# Patient Record
Sex: FEMALE | Race: BLACK OR AFRICAN AMERICAN | Employment: OTHER | URBAN - METROPOLITAN AREA
[De-identification: names, ages, dates, MRNs, and addresses within clinical notes are randomized per-mention and may not be internally consistent; named-entity substitution may affect disease eponyms.]

---

## 2017-04-17 ENCOUNTER — OFFICE VISIT (OUTPATIENT)
Dept: SURGERY | Age: 67
End: 2017-04-17

## 2017-04-17 VITALS
HEIGHT: 63 IN | HEART RATE: 72 BPM | SYSTOLIC BLOOD PRESSURE: 145 MMHG | RESPIRATION RATE: 18 BRPM | BODY MASS INDEX: 34.05 KG/M2 | WEIGHT: 192.2 LBS | TEMPERATURE: 98.2 F | OXYGEN SATURATION: 98 % | DIASTOLIC BLOOD PRESSURE: 66 MMHG

## 2017-04-17 DIAGNOSIS — L98.9 BACK SKIN LESION: Primary | ICD-10-CM

## 2017-04-17 NOTE — PROGRESS NOTES
Patient is a 78 y. o.female who presents for an evaluation of a skin lesion of the back which she relays as having been present for the past 23 years. She relays the area has grown larger in size over the past year, however, it is not painful nor has the area ever been opened or drained.

## 2017-04-17 NOTE — MR AVS SNAPSHOT
Visit Information Date & Time Provider Department Dept. Phone Encounter #  
 4/17/2017  8:45 AM MD [facility]land [facility] [phone] [id number] Your Appointments 9/13/2017  9:30 AM  
ESTABLISHED PATIENT with QUENTIN [facility] ([address]) Appt Note: Return in about 6 months (around 9/14/2017) for FU with Ava Brooks in 6 mos with prior renal US. [address] [facility], [place] [facility] [address]  
  
   
 [address] [facility], [address] [facility] [address] Upcoming Health Maintenance Date Due Hepatitis C Screening 1950 HEMOGLOBIN A1C Q6M 1950 LIPID PANEL Q1 1950 FOOT EXAM Q1 3/15/1961 MICROALBUMIN Q1 3/15/1961 EYE EXAM RETINAL OR DILATED Q1 3/15/1961 DTaP/Tdap/Td series (1 - Tdap) 3/15/1972 FOBT Q 1 YEAR AGE 50-75 3/15/2001 ZOSTER VACCINE AGE 60> 3/15/2011 GLAUCOMA SCREENING Q2Y 3/15/2016 Pneumococcal 65+ High/Highest Risk (1 of 2 - PCV13) 3/15/2016 MEDICARE YEARLY EXAM 3/15/2016 INFLUENZA AGE 9 TO ADULT 8/1/2016 BREAST CANCER SCRN MAMMOGRAM 4/25/2018 Allergies as of 4/17/2017  Review Complete On: 3/14/2017 By: Lydia Dudley MD  
  
 Severity Noted Reaction Type Reactions Latex, Natural Rubber    Unknown (comments) Anastrozole  10/19/2015    Other (comments) CAUSED JOINT PAIN Lipitor [Atorvastatin]  09/27/2013    Other (comments) Muscle weakness Current Immunizations  Never Reviewed No immunizations on file. Not reviewed this visit Vitals BP Pulse Temp Resp Height(growth percentile) Weight(growth percentile) 145/66 (BP 1 Location: Right arm, BP Patient Position: Sitting) 72 98.2 °F (36.8 °C) (Oral) 18 5' 3\" (1.6 m) 192 lb 3.2 oz (87.2 kg) SpO2 BMI OB Status Smoking Status 98% 34.05 kg/m2 Postmenopausal Never Smoker BMI and BSA Data Body Mass Index Body Surface Area 34.05 kg/m 2 1.97 m 2 [facility] [place], [address] [phone] Your Updated Medication List  
  
   
This list is accurate as of: 4/17/17  9:17 AM.  Always use your most recent med list.  
  
  
  
  
 citalopram 20 mg tablet Commonly known as:  Dorthea Turkmen FLEXERIL PO Take  by mouth as needed. glucose blood VI test strips strip Commonly known as:  ASCENSIA AUTODISC VI, ONE TOUCH ULTRA TEST VI  
by Does Not Apply route See Admin Instructions. HumaLOG Mix 75-25 100 unit/mL (75-25) injection Generic drug:  insulin lispro protamine/insulin lispro  
110 Units by SubCUTAneous route once. Lancets Misc  
by Does Not Apply route. losartan-hydroCHLOROthiazide 100-12.5 mg per tablet Commonly known as:  HYZAAR Take 1 Tab by mouth daily. multivitamin tablet Commonly known as:  ONE A DAY Take 1 Tab by mouth daily. KISHAN D ARCO BARK PO Take  by mouth. PROBIOTIC 4X 10-15 mg Tbec Generic drug:  B.infantis-B.ani-B.long-B.bifi Take  by mouth. VITAMIN D3 1,000 unit Cap Generic drug:  cholecalciferol Take  by mouth. Patient Instructions If you have any questions or concerns about today's appointment, the verbal and/or written instructions you were given for follow up care, please call our office at [phone]. [facility] - [address], [place] [phone] office [phone] Introducing [facility] & HEALTH SERVICES! [facility] introduces [employer] patient portal. Now you can access parts of your medical record, email your doctor's office, and request medication refills online. 1. In your internet browser, go to [email or web address] 2. Click on the First Time User? Click Here link in the Sign In box. You will see the New Member Sign Up page. 3. Enter your [employer] Access Code exactly as it appears below. You will not need to use this code after youve completed the sign-up process. If you do not sign up before the expiration date, you must request a new code. · [employer] Access Code: [email or web address] Expires: 4/19/2017 10:24 AM 
 
4. Enter the last four digits of your Social Security Number (xxxx) and Date of Birth (mm/dd/yyyy) as indicated and click Submit. You will be taken to the next sign-up page. 5. Create a [employer] ID. This will be your [employer] login ID and cannot be changed, so think of one that is secure and easy to remember. 6. Create a [employer] password. You can change your password at any time. 7. Enter your Password Reset Question and Answer. This can be used at a later time if you forget your password. 8. Enter your e-mail address. You will receive e-mail notification when new information is available in [address] 9. Click Sign Up. You can now view and download portions of your medical record. 10. Click the Download Summary menu link to download a portable copy of your medical information. If you have questions, please visit the Frequently Asked Questions section of the [employer] website. Remember, [employer] is NOT to be used for urgent needs. For medical emergencies, dial 911. Now available from your iPhone and Android! Please provide this summary of care documentation to your next provider. Your primary care clinician is listed as Phoenix Montes. If you have any questions after today's visit, please call [phone].

## 2017-04-17 NOTE — PROGRESS NOTES
General Surgery Consult    Criss Lai  Admit date: (Not on file)    MRN: [id number]     : 1950     Age: 78 y.o. Attending Physician: Ciera Yee MD [facility]      History of Present Illness:      Criss Lai is a 78 y.o. female who presented with a back skin lesion. She had this lesion for more than 25 years. No pain. Possibly increasing in size. No fever. No history of skin cancer.  Had breast cancer in the past.    Patient Active Problem List    Diagnosis Date Noted    Cancer of left breast ([address] 2013    DM (diabetes mellitus) ([address] 2013    HTN (hypertension) 2013    Carpal tunnel syndrome 2012    Contusion of wrist 2012    Arthritis of knee 2012    Renal mass 2011    Follow-up examination following surgery     Anxiety state 10/16/2007    Benign essential HTN 10/16/2007    Generalized anxiety disorder 10/16/2007    Headache 10/16/2007    Major depressive disorder, single episode, moderate ([address] 10/16/2007    Breast cancer, female ([address] 10/16/2007    Adiposity 10/16/2007    Multiple-type hyperlipidemia 10/16/2007    Atrophic vaginitis 10/16/2007    Type 2 diabetes mellitus ([address] 10/16/2007    Sleep apnea 10/16/2007     Past Medical History:   Diagnosis Date    Ankle swelling     Arthritis     Breast cancer ([address] 2003    Lt breast    Cancer [facility])     Left Breast    Chemotherapy     6 weeks for lt breast    Colon polyps     Diabetes ([address]     Follow-up examination following surgery     Heart murmur     Hematuria     Hypertension     Joint pain     Renal cyst     Seizures ([address]     Weight gain       Past Surgical History:   Procedure Laterality Date    DELIVERY       x 3    HX GYN      ovary removed    HX MODIFIED RADICAL MASTECTOMY      Left Breast    HX NEPHRECTOMY  11    left partial nephrectomy - Dr. Brandt Martinez      Social History   Substance Use Topics    Smoking status: Never Smoker    Smokeless tobacco: Never Used    Alcohol use No      History   Smoking Status    Never Smoker   Smokeless Tobacco    Never Used     Family History   Problem Relation Age of Onset    Heart Disease Mother     Asthma Mother     Hypertension Father     Colon Cancer Father     Diabetes Maternal Grandmother     Breast Cancer Paternal Grandmother     Breast Cancer Other       Current Outpatient Prescriptions   Medication Sig    B.infantis-B.ani-B.long-B.bifi (PROBIOTIC 4X) 10-15 mg TbEC Take  by mouth.  KISHAN D ARCO BARK PO Take  by mouth.  citalopram (CELEXA) 20 mg tablet     Cholecalciferol, Vitamin D3, (VITAMIN D3) 1,000 unit cap Take  by mouth.  insulin mixture 75-25 (HUMALOG MIX 75-25) 100 unit/mL (75-25) Susp 110 Units by SubCUTAneous route once.  CYCLOBENZAPRINE HCL (FLEXERIL PO) Take  by mouth as needed.  losartan-hydrochlorothiazide (HYZAAR) 100-12.5 mg per tablet Take 1 Tab by mouth daily.  glucose blood VI test strips (ASCENSIA AUTODISC VI, ONE TOUCH ULTRA TEST VI) strip by Does Not Apply route See Admin Instructions.  Lancets Misc by Does Not Apply route.  multivitamin (ONE A DAY) tablet Take 1 Tab by mouth daily. No current facility-administered medications for this visit. Allergies   Allergen Reactions    Latex, Natural Rubber Unknown (comments)    Anastrozole Other (comments)     CAUSED JOINT PAIN    Lipitor [Atorvastatin] Other (comments)     Muscle weakness          Review of Systems:  Pertinent items are noted in the History of Present Illness.     Objective:     Visit Vitals    /66 (BP 1 Location: Right arm, BP Patient Position: Sitting)    Pulse 72    Temp 98.2 °F (36.8 °C) (Oral)    Resp 18    Ht 5' 3\" (1.6 m)    Wt 87.2 kg (192 lb 3.2 oz)    SpO2 98%    BMI 34.05 kg/m2       Physical Exam:      General:  in no apparent distress and well developed and well nourished   Eyes:  conjunctivae and sclerae normal, pupils equal, round, reactive to light   Throat & Neck: Normal                   Skin: There is a 2 mm small sebaceous cyst in the back just left to the midline. No overlying skin changes. Imaging and Lab Review:     CBC: No results found for: WBC, RBC, HGB, HCT, PLT, HGBEXT, HCTEXT, PLTEXT  BMP: No results found for: GLU, NA, K, CL, CO2, BUN, CREA, CA  CMP:No results found for: GLU, NA, K, CL, CO2, BUN, CREA, CA, AGAP, BUCR, TBIL, GPT, AP, TP, ALB, GLOB, AGRAT    No results found for this or any previous visit (from the past 24 hour(s)). images and reports reviewed    Assessment:   Mal Rawls is a 78 y.o. female is presenting with a very small (2 to 3 mm) sebaceous cyst.    Plan:     No need for any surgical intervention.      Please call me if you have any questions (cell phone: [phone])     Signed By: Mahogany Ly MD     April 17, 2017

## 2017-04-17 NOTE — PATIENT INSTRUCTIONS
If you have any questions or concerns about today's appointment, the verbal and/or written instructions you were given for follow up care, please call our office at [phone].     [facility] - [address]    [phone] office  [phone]

## 2017-04-18 ENCOUNTER — TELEPHONE (OUTPATIENT)
Dept: SURGERY | Age: 67
End: 2017-04-18

## 2017-04-18 NOTE — TELEPHONE ENCOUNTER
TOD on cell VM with reference to [facility] email \"we are not contracted (non-par) with [employer] for their Medicare products\". Notified patient we don't accept this insurance and she may be lialbe for payment not discounted by her plan. Home phone had no vociemail. ...

## 2017-06-12 ENCOUNTER — HOSPITAL ENCOUNTER (OUTPATIENT)
Dept: MAMMOGRAPHY | Age: 67
Discharge: HOME | End: 2017-06-12
Payer: MEDICARE

## 2017-06-12 DIAGNOSIS — Z12.31 VISIT FOR SCREENING MAMMOGRAM: ICD-10-CM

## 2017-06-12 PROCEDURE — 77067 SCR MAMMO BI INCL CAD: CPT

## 2017-09-25 ENCOUNTER — HOSPITAL ENCOUNTER (OUTPATIENT)
Dept: GENERAL RADIOLOGY | Age: 67
Discharge: HOME | End: 2017-09-25
Payer: COMMERCIAL

## 2017-09-25 DIAGNOSIS — N95.1 MENOPAUSAL SYMPTOMS: ICD-10-CM

## 2017-09-25 PROCEDURE — 77080 DXA BONE DENSITY AXIAL: CPT

## 2018-09-06 ENCOUNTER — HOSPITAL ENCOUNTER (OUTPATIENT)
Dept: MAMMOGRAPHY | Age: 68
Discharge: HOME | End: 2018-09-06
Attending: OBSTETRICS & GYNECOLOGY
Payer: MEDICARE

## 2018-09-06 DIAGNOSIS — Z12.31 VISIT FOR SCREENING MAMMOGRAM: ICD-10-CM

## 2018-09-06 PROCEDURE — 77067 SCR MAMMO BI INCL CAD: CPT

## 2019-11-07 ENCOUNTER — HOSPITAL ENCOUNTER (OUTPATIENT)
Dept: LAB | Age: 69
Discharge: HOME | End: 2019-11-07

## 2019-11-07 ENCOUNTER — HOSPITAL ENCOUNTER (OUTPATIENT)
Dept: GENERAL RADIOLOGY | Age: 69
Discharge: HOME | End: 2019-11-07
Payer: MEDICARE

## 2019-11-07 ENCOUNTER — HOSPITAL ENCOUNTER (OUTPATIENT)
Dept: LAB | Age: 69
Discharge: HOME | End: 2019-11-07
Payer: MEDICARE

## 2019-11-07 DIAGNOSIS — M51.04 HNP (HERNIATED NUCLEUS PULPOSUS WITH MYELOPATHY), THORACIC: ICD-10-CM

## 2019-11-07 DIAGNOSIS — Z01.818 PRE-OP TESTING: ICD-10-CM

## 2019-11-07 LAB
ATRIAL RATE: 68 BPM
CALCULATED P AXIS, ECG09: 66 DEGREES
CALCULATED R AXIS, ECG10: 23 DEGREES
CALCULATED T AXIS, ECG11: 60 DEGREES
DIAGNOSIS, 93000: NORMAL
P-R INTERVAL, ECG05: 176 MS
Q-T INTERVAL, ECG07: 396 MS
QRS DURATION, ECG06: 96 MS
QTC CALCULATION (BEZET), ECG08: 421 MS
SENTARA SPECIMEN COL,SENBCF: NORMAL
VENTRICULAR RATE, ECG03: 68 BPM

## 2019-11-07 PROCEDURE — 71046 X-RAY EXAM CHEST 2 VIEWS: CPT

## 2019-11-07 PROCEDURE — 93005 ELECTROCARDIOGRAM TRACING: CPT

## 2019-11-07 PROCEDURE — 99001 SPECIMEN HANDLING PT-LAB: CPT

## 2019-12-31 RX ORDER — TELMISARTAN AND HYDROCHLORTHIAZIDE 80; 12.5 MG/1; MG/1
1 TABLET ORAL
COMMUNITY
Start: 2019-11-13

## 2019-12-31 NOTE — PERIOP NOTES
PAT - SURGICAL PRE-ADMISSION INSTRUCTIONS    NAME:  Dennis Soto                                                          TODAY'S DATE:  12/31/2019    SURGERY DATE:  1/7/2020                                  SURGERY ARRIVAL TIME:   0530    1. Do NOT eat or drink anything, including candy or gum, after MIDNIGHT on 01/06 , unless you have specific instructions from your Surgeon or Anesthesia Provider to do so. 2. No smoking on the day of surgery. 3. No alcohol 24 hours prior to the day of surgery. 4. No recreational drugs for one week prior to the day of surgery. 5. Leave all valuables, including money/purse, at home. 6. Remove all jewelry, nail polish, makeup (including mascara); no lotions, powders, deodorant, or perfume/cologne/after shave. 7. Glasses/Contact lenses and Dentures may be worn to the hospital.  They will be removed prior to surgery. 8. Call your doctor if symptoms of a cold or illness develop within 24 ours prior to surgery. 9. AN ADULT MUST DRIVE YOU HOME AFTER OUTPATIENT SURGERY. 10. If you are having an OUTPATIENT procedure, please make arrangements for a responsible adult to be with you for 24 hours after your surgery. 11. If you are admitted to the hospital, you will be assigned to a bed after surgery is complete. Normally a family member will not be able to see you until you are in your assigned bed. 15. Family is encouraged to accompany you to the hospital.  We ask visitors in the treatment area to be limited to ONE person at a time to ensure patient privacy. EXCEPTIONS WILL BE MADE AS NEEDED. 15. Children under 12 are discouraged from entering the treatment area and need to be supervised by an adult when in the waiting room. Special Instructions: Take these medications the morning of surgery with a sip of water:  NONE    Patient Prep:    use CHG solution    These surgical instructions were reviewed with Bassam Palafox during the PAT phone call. Directions:   On the morning of surgery, please go to the [facility]. Enter the building from the [facility] entrance, 1st floor (next to the Emergency Room entrance). Take the elevator to the 2nd floor. Sign in at the Registration Desk.     If you have any questions and/or concerns, please do not hesitate to call:  (Prior to the day of surgery)  [facility] unit:  [phone]  (Day of surgery)  [facility] unit:  [phone]

## 2020-01-02 ENCOUNTER — HOSPITAL ENCOUNTER (OUTPATIENT)
Dept: LAB | Age: 70
Discharge: HOME | End: 2020-01-02

## 2020-01-02 ENCOUNTER — HOSPITAL ENCOUNTER (OUTPATIENT)
Dept: PREADMISSION TESTING | Age: 70
Discharge: HOME | End: 2020-01-02

## 2020-01-02 LAB — SENTARA SPECIMEN COL,SENBCF: NORMAL

## 2020-01-02 PROCEDURE — 99001 SPECIMEN HANDLING PT-LAB: CPT

## 2020-01-06 ENCOUNTER — ANESTHESIA EVENT (OUTPATIENT)
Dept: SURGERY | Age: 70
End: 2020-01-06
Payer: MEDICARE

## 2020-01-07 ENCOUNTER — APPOINTMENT (OUTPATIENT)
Dept: GENERAL RADIOLOGY | Age: 70
End: 2020-01-07
Attending: ORTHOPAEDIC SURGERY
Payer: MEDICARE

## 2020-01-07 ENCOUNTER — ANESTHESIA (OUTPATIENT)
Dept: SURGERY | Age: 70
End: 2020-01-07
Payer: MEDICARE

## 2020-01-07 ENCOUNTER — HOSPITAL ENCOUNTER (OUTPATIENT)
Age: 70
Setting detail: OUTPATIENT SURGERY
Discharge: HOME | End: 2020-01-07
Attending: ORTHOPAEDIC SURGERY | Admitting: ORTHOPAEDIC SURGERY
Payer: MEDICARE

## 2020-01-07 VITALS
HEIGHT: 64 IN | OXYGEN SATURATION: 94 % | BODY MASS INDEX: 35.17 KG/M2 | RESPIRATION RATE: 16 BRPM | WEIGHT: 206 LBS | HEART RATE: 60 BPM | SYSTOLIC BLOOD PRESSURE: 154 MMHG | DIASTOLIC BLOOD PRESSURE: 77 MMHG | TEMPERATURE: 98.4 F

## 2020-01-07 LAB — GLUCOSE BLD STRIP.AUTO-MCNC: 100 MG/DL (ref 70–110)

## 2020-01-07 PROCEDURE — 82962 GLUCOSE BLOOD TEST: CPT

## 2020-01-07 PROCEDURE — 74011250637 HC RX REV CODE- 250/637: Performed by: NURSE ANESTHETIST, CERTIFIED REGISTERED

## 2020-01-07 PROCEDURE — 74011250636 HC RX REV CODE- 250/636: Performed by: NURSE ANESTHETIST, CERTIFIED REGISTERED

## 2020-01-07 RX ORDER — LIDOCAINE HYDROCHLORIDE 10 MG/ML
0.1 INJECTION, SOLUTION EPIDURAL; INFILTRATION; INTRACAUDAL; PERINEURAL AS NEEDED
Status: DISCONTINUED | OUTPATIENT
Start: 2020-01-07 | End: 2020-01-07 | Stop reason: HOSPADM

## 2020-01-07 RX ORDER — FAMOTIDINE 20 MG/1
20 TABLET, FILM COATED ORAL ONCE
Status: COMPLETED | OUTPATIENT
Start: 2020-01-07 | End: 2020-01-07

## 2020-01-07 RX ORDER — SODIUM CHLORIDE, SODIUM LACTATE, POTASSIUM CHLORIDE, CALCIUM CHLORIDE 600; 310; 30; 20 MG/100ML; MG/100ML; MG/100ML; MG/100ML
50 INJECTION, SOLUTION INTRAVENOUS CONTINUOUS
Status: DISCONTINUED | OUTPATIENT
Start: 2020-01-07 | End: 2020-01-07 | Stop reason: HOSPADM

## 2020-01-07 RX ORDER — MIDAZOLAM HYDROCHLORIDE 1 MG/ML
INJECTION, SOLUTION INTRAMUSCULAR; INTRAVENOUS AS NEEDED
Status: SHIPPED | OUTPATIENT
Start: 2020-01-07

## 2020-01-07 RX ORDER — INSULIN LISPRO 100 [IU]/ML
INJECTION, SOLUTION INTRAVENOUS; SUBCUTANEOUS ONCE
Status: DISCONTINUED | OUTPATIENT
Start: 2020-01-07 | End: 2020-01-07 | Stop reason: HOSPADM

## 2020-01-07 RX ORDER — CEFAZOLIN SODIUM 2 G/50ML
2 SOLUTION INTRAVENOUS
Status: DISCONTINUED | OUTPATIENT
Start: 2020-01-07 | End: 2020-01-07 | Stop reason: HOSPADM

## 2020-01-07 RX ORDER — INSULIN ASPART 100 [IU]/ML
30 INJECTION, SUSPENSION SUBCUTANEOUS
COMMUNITY

## 2020-01-07 RX ADMIN — MIDAZOLAM HYDROCHLORIDE 2 MG: 1 INJECTION, SOLUTION INTRAMUSCULAR; INTRAVENOUS at 07:33

## 2020-01-07 RX ADMIN — SODIUM CHLORIDE, SODIUM LACTATE, POTASSIUM CHLORIDE, AND CALCIUM CHLORIDE 50 ML/HR: 600; 310; 30; 20 INJECTION, SOLUTION INTRAVENOUS at 06:51

## 2020-01-07 RX ADMIN — FAMOTIDINE 20 MG: 20 TABLET ORAL at 06:35

## 2020-01-07 NOTE — DISCHARGE INSTRUCTIONS
DISCHARGE SUMMARY from Nurse    PATIENT INSTRUCTIONS:    After general anesthesia or intravenous sedation, for 24 hours or while taking prescription Narcotics:  · Limit your activities  · Do not drive and operate hazardous machinery  · Do not make important personal or business decisions  · Do  not drink alcoholic beverages  · If you have not urinated within 8 hours after discharge, please contact your surgeon on call. Report the following to your surgeon:  · Excessive pain, swelling, redness or odor of or around the surgical area  · Temperature over 100.5  · Nausea and vomiting lasting longer than 4 hours or if unable to take medications  · Any signs of decreased circulation or nerve impairment to extremity: change in color, persistent  numbness, tingling, coldness or increase pain  · Any questions    What to do at Home:    These are general instructions for a healthy lifestyle:    No smoking/ No tobacco products/ Avoid exposure to second hand smoke  Surgeon General's Warning:  Quitting smoking now greatly reduces serious risk to your health. Obesity, smoking, and sedentary lifestyle greatly increases your risk for illness    A healthy diet, regular physical exercise & weight monitoring are important for maintaining a healthy lifestyle    You may be retaining fluid if you have a history of heart failure or if you experience any of the following symptoms:  Weight gain of 3 pounds or more overnight or 5 pounds in a week, increased swelling in our hands or feet or shortness of breath while lying flat in bed. Please call your doctor as soon as you notice any of these symptoms; do not wait until your next office visit. The discharge information has been reviewed with the patient. The patient verbalized understanding.   Discharge medications reviewed with the patient and appropriate educational materials and side effects teaching were provided. ___________________________________________________________________________________________________________________________________  Patient armband removed and given to patient to take home.   Patient was informed of the privacy risks if armband lost or stolen

## 2020-01-07 NOTE — PROGRESS NOTES
Ortho    Case cancelled as one tray was contaminated, there is no back-up tray and the sterizizer is down.     Will disch and plan to do as second case on Friday.    cadence

## 2020-01-07 NOTE — PERIOP NOTES
Pre-Op Summary    Pt arrived via car with family/friend and is oriented to time, place, person and situation. Patient with steady gait with none assistive devices. Visit Vitals  /75 (BP 1 Location: Right arm, BP Patient Position: At rest)   Pulse 66   Temp 98.4 °F (36.9 °C)   Resp 16   Ht 5' 4\" (1.626 m)   Wt 93.4 kg (206 lb)   SpO2 100%   BMI 35.36 kg/m²       Peripheral IV located on Right hand . Patients belongings are located with . Patient's point of contact is Erikc Lynch, дмитрий and their contact number is: [phone]. They will be in the waiting room. They are able to receive medication information. They will be admitted.

## 2020-01-07 NOTE — PROGRESS NOTES
conducted a pre-surgery visit with Joselyn Pressley, who is a 77 y.o.,female. The  provided the following Interventions:  Initiated a relationship of care and support. Offered prayer and assurance of continued prayers on patient's behalf. Plan:  Chaplains will continue to follow and will provide pastoral care on an as needed/requested basis.  recommends bedside caregivers page  on duty if patient shows signs of acute spiritual or emotional distress.     Bahman Lawson   Spiritual Care   [phone]

## 2020-01-08 NOTE — PERIOP NOTES
Isabel noted to filter in [place] tray in the form of a \"cut\". SPD made aware and stated a 45min turn over to process. Dr. Baca Matters made aware. Case cancelled and rescheduled by surgeon. Pt brought [facility] to [facility].  Hand off given to Stacey Craven, RN

## 2020-01-09 ENCOUNTER — ANESTHESIA EVENT (OUTPATIENT)
Dept: SURGERY | Age: 70
End: 2020-01-09
Payer: MEDICARE

## 2020-01-10 ENCOUNTER — APPOINTMENT (OUTPATIENT)
Dept: GENERAL RADIOLOGY | Age: 70
End: 2020-01-10
Attending: ORTHOPAEDIC SURGERY
Payer: MEDICARE

## 2020-01-10 ENCOUNTER — HOSPITAL ENCOUNTER (OUTPATIENT)
Age: 70
Discharge: HOME | End: 2020-01-11
Attending: ORTHOPAEDIC SURGERY | Admitting: ORTHOPAEDIC SURGERY
Payer: MEDICARE

## 2020-01-10 ENCOUNTER — ANESTHESIA (OUTPATIENT)
Dept: SURGERY | Age: 70
End: 2020-01-10
Payer: MEDICARE

## 2020-01-10 DIAGNOSIS — M48.02 CERVICAL STENOSIS OF SPINAL CANAL: Primary | ICD-10-CM

## 2020-01-10 DIAGNOSIS — Z09 FOLLOW-UP EXAMINATION FOLLOWING SURGERY: ICD-10-CM

## 2020-01-10 LAB
GLUCOSE BLD STRIP.AUTO-MCNC: 129 MG/DL (ref 70–110)
GLUCOSE BLD STRIP.AUTO-MCNC: 171 MG/DL (ref 70–110)
GLUCOSE BLD STRIP.AUTO-MCNC: 280 MG/DL (ref 70–110)

## 2020-01-10 PROCEDURE — C1713 ANCHOR/SCREW BN/BN,TIS/BN: HCPCS | Performed by: ORTHOPAEDIC SURGERY

## 2020-01-10 PROCEDURE — 74011250636 HC RX REV CODE- 250/636

## 2020-01-10 PROCEDURE — 74011250636 HC RX REV CODE- 250/636: Performed by: ANESTHESIOLOGY

## 2020-01-10 PROCEDURE — 77030009868 HC PIN DISTR CASPR AESC -B: Performed by: ORTHOPAEDIC SURGERY

## 2020-01-10 PROCEDURE — 77030014647 HC SEAL FBRN TISSL BAXT -D: Performed by: ORTHOPAEDIC SURGERY

## 2020-01-10 PROCEDURE — 77030013079 HC BLNKT BAIR HGGR 3M -A: Performed by: ANESTHESIOLOGY

## 2020-01-10 PROCEDURE — 74011250636 HC RX REV CODE- 250/636: Performed by: INTERNAL MEDICINE

## 2020-01-10 PROCEDURE — 72040 X-RAY EXAM NECK SPINE 2-3 VW: CPT

## 2020-01-10 PROCEDURE — 74011250636 HC RX REV CODE- 250/636: Performed by: ORTHOPAEDIC SURGERY

## 2020-01-10 PROCEDURE — 77030021678 HC GLIDESCP STAT DISP VERT -B: Performed by: ANESTHESIOLOGY

## 2020-01-10 PROCEDURE — 76210000001 HC OR PH I REC 2.5 TO 3 HR: Performed by: ORTHOPAEDIC SURGERY

## 2020-01-10 PROCEDURE — 74011250637 HC RX REV CODE- 250/637: Performed by: ANESTHESIOLOGY

## 2020-01-10 PROCEDURE — 77030019908 HC STETH ESOPH SIMS -A: Performed by: ANESTHESIOLOGY

## 2020-01-10 PROCEDURE — 77030037302 HC SPCR CERV LORDTC INLC -G: Performed by: ORTHOPAEDIC SURGERY

## 2020-01-10 PROCEDURE — 74011250636 HC RX REV CODE- 250/636: Performed by: NURSE ANESTHETIST, CERTIFIED REGISTERED

## 2020-01-10 PROCEDURE — 74011000250 HC RX REV CODE- 250: Performed by: ORTHOPAEDIC SURGERY

## 2020-01-10 PROCEDURE — 74011000272 HC RX REV CODE- 272: Performed by: ORTHOPAEDIC SURGERY

## 2020-01-10 PROCEDURE — 77010033678 HC OXYGEN DAILY

## 2020-01-10 PROCEDURE — 74011000250 HC RX REV CODE- 250: Performed by: NURSE ANESTHETIST, CERTIFIED REGISTERED

## 2020-01-10 PROCEDURE — 77030031139 HC SUT VCRL2 J&J -A: Performed by: ORTHOPAEDIC SURGERY

## 2020-01-10 PROCEDURE — 77030003666 HC NDL SPINAL BD -A: Performed by: ORTHOPAEDIC SURGERY

## 2020-01-10 PROCEDURE — 76010000174 HC OR TIME 3.5 TO 4 HR INTENSV-TIER 1: Performed by: ORTHOPAEDIC SURGERY

## 2020-01-10 PROCEDURE — 77030006773 HC BLD SAW OSC BRSM -A: Performed by: ORTHOPAEDIC SURGERY

## 2020-01-10 PROCEDURE — 77030004391 HC BUR FLUT MEDT -C: Performed by: ORTHOPAEDIC SURGERY

## 2020-01-10 PROCEDURE — 77030014007 HC SPNG HEMSTAT J&J -B: Performed by: ORTHOPAEDIC SURGERY

## 2020-01-10 PROCEDURE — 77030018673: Performed by: ORTHOPAEDIC SURGERY

## 2020-01-10 PROCEDURE — 77030003028 HC SUT VCRL J&J -A: Performed by: ORTHOPAEDIC SURGERY

## 2020-01-10 PROCEDURE — 77030030102 HC BIT DRL PYRNES K2M -B: Performed by: ORTHOPAEDIC SURGERY

## 2020-01-10 PROCEDURE — 65270000029 HC RM PRIVATE

## 2020-01-10 PROCEDURE — 74011000258 HC RX REV CODE- 258: Performed by: NURSE ANESTHETIST, CERTIFIED REGISTERED

## 2020-01-10 PROCEDURE — 82962 GLUCOSE BLOOD TEST: CPT

## 2020-01-10 PROCEDURE — 74011250637 HC RX REV CODE- 250/637: Performed by: INTERNAL MEDICINE

## 2020-01-10 PROCEDURE — 74011636637 HC RX REV CODE- 636/637

## 2020-01-10 PROCEDURE — 77030010516 HC APPL HEMA CLP TELE -B: Performed by: ORTHOPAEDIC SURGERY

## 2020-01-10 PROCEDURE — 77030008683 HC TU ET CUF COVD -A: Performed by: ANESTHESIOLOGY

## 2020-01-10 PROCEDURE — 76060000038 HC ANESTHESIA 3.5 TO 4 HR: Performed by: ORTHOPAEDIC SURGERY

## 2020-01-10 PROCEDURE — 77030030163 HC BN WAX J&J -A: Performed by: ORTHOPAEDIC SURGERY

## 2020-01-10 PROCEDURE — 77030010938 HC CLP LIG TELE -A: Performed by: ORTHOPAEDIC SURGERY

## 2020-01-10 PROCEDURE — 77030018836 HC SOL IRR NACL ICUM -A: Performed by: ORTHOPAEDIC SURGERY

## 2020-01-10 PROCEDURE — 74011636637 HC RX REV CODE- 636/637: Performed by: INTERNAL MEDICINE

## 2020-01-10 RX ORDER — SODIUM CHLORIDE AND POTASSIUM CHLORIDE .9; .15 G/100ML; G/100ML
SOLUTION INTRAVENOUS
Status: DISPENSED
Start: 2020-01-10 | End: 2020-01-11

## 2020-01-10 RX ORDER — SODIUM CHLORIDE 0.9 % (FLUSH) 0.9 %
5-40 SYRINGE (ML) INJECTION AS NEEDED
Status: DISCONTINUED | OUTPATIENT
Start: 2020-01-10 | End: 2020-01-10 | Stop reason: HOSPADM

## 2020-01-10 RX ORDER — SUCCINYLCHOLINE CHLORIDE 20 MG/ML
INJECTION INTRAMUSCULAR; INTRAVENOUS AS NEEDED
Status: DISCONTINUED | OUTPATIENT
Start: 2020-01-10 | End: 2020-01-10 | Stop reason: HOSPADM

## 2020-01-10 RX ORDER — CLINDAMYCIN PHOSPHATE 900 MG/50ML
INJECTION INTRAVENOUS AS NEEDED
Status: DISCONTINUED | OUTPATIENT
Start: 2020-01-10 | End: 2020-01-10 | Stop reason: HOSPADM

## 2020-01-10 RX ORDER — HYDROCODONE BITARTRATE AND ACETAMINOPHEN 10; 325 MG/1; MG/1
1 TABLET ORAL
Status: DISCONTINUED | OUTPATIENT
Start: 2020-01-10 | End: 2020-01-11 | Stop reason: HOSPADM

## 2020-01-10 RX ORDER — INSULIN GLARGINE 100 [IU]/ML
15 INJECTION, SOLUTION SUBCUTANEOUS
Status: DISCONTINUED | OUTPATIENT
Start: 2020-01-10 | End: 2020-01-11 | Stop reason: HOSPADM

## 2020-01-10 RX ORDER — SODIUM CHLORIDE, SODIUM LACTATE, POTASSIUM CHLORIDE, CALCIUM CHLORIDE 600; 310; 30; 20 MG/100ML; MG/100ML; MG/100ML; MG/100ML
INJECTION, SOLUTION INTRAVENOUS
Status: DISCONTINUED | OUTPATIENT
Start: 2020-01-10 | End: 2020-01-10 | Stop reason: HOSPADM

## 2020-01-10 RX ORDER — PROPOFOL 10 MG/ML
VIAL (ML) INTRAVENOUS
Status: DISCONTINUED | OUTPATIENT
Start: 2020-01-10 | End: 2020-01-10 | Stop reason: HOSPADM

## 2020-01-10 RX ORDER — GLYCOPYRROLATE 0.2 MG/ML
INJECTION INTRAMUSCULAR; INTRAVENOUS AS NEEDED
Status: DISCONTINUED | OUTPATIENT
Start: 2020-01-10 | End: 2020-01-10 | Stop reason: HOSPADM

## 2020-01-10 RX ORDER — PROPOFOL 10 MG/ML
INJECTION, EMULSION INTRAVENOUS AS NEEDED
Status: DISCONTINUED | OUTPATIENT
Start: 2020-01-10 | End: 2020-01-10 | Stop reason: HOSPADM

## 2020-01-10 RX ORDER — OXYCODONE AND ACETAMINOPHEN 10; 325 MG/1; MG/1
2 TABLET ORAL
Status: DISCONTINUED | OUTPATIENT
Start: 2020-01-10 | End: 2020-01-10

## 2020-01-10 RX ORDER — GLYCOPYRROLATE 0.2 MG/ML
INJECTION INTRAMUSCULAR; INTRAVENOUS AS NEEDED
Status: DISCONTINUED | OUTPATIENT
Start: 2020-01-10 | End: 2020-01-10

## 2020-01-10 RX ORDER — ONDANSETRON 2 MG/ML
INJECTION INTRAMUSCULAR; INTRAVENOUS AS NEEDED
Status: DISCONTINUED | OUTPATIENT
Start: 2020-01-10 | End: 2020-01-10 | Stop reason: HOSPADM

## 2020-01-10 RX ORDER — FENTANYL CITRATE 50 UG/ML
INJECTION, SOLUTION INTRAMUSCULAR; INTRAVENOUS AS NEEDED
Status: DISCONTINUED | OUTPATIENT
Start: 2020-01-10 | End: 2020-01-10 | Stop reason: HOSPADM

## 2020-01-10 RX ORDER — SODIUM CHLORIDE 9 MG/ML
125 INJECTION, SOLUTION INTRAVENOUS CONTINUOUS
Status: DISCONTINUED | OUTPATIENT
Start: 2020-01-10 | End: 2020-01-11 | Stop reason: HOSPADM

## 2020-01-10 RX ORDER — SODIUM CHLORIDE, SODIUM LACTATE, POTASSIUM CHLORIDE, CALCIUM CHLORIDE 600; 310; 30; 20 MG/100ML; MG/100ML; MG/100ML; MG/100ML
125 INJECTION, SOLUTION INTRAVENOUS CONTINUOUS
Status: DISCONTINUED | OUTPATIENT
Start: 2020-01-10 | End: 2020-01-10 | Stop reason: HOSPADM

## 2020-01-10 RX ORDER — DEXAMETHASONE SODIUM PHOSPHATE 4 MG/ML
INJECTION, SOLUTION INTRA-ARTICULAR; INTRALESIONAL; INTRAMUSCULAR; INTRAVENOUS; SOFT TISSUE AS NEEDED
Status: DISCONTINUED | OUTPATIENT
Start: 2020-01-10 | End: 2020-01-10 | Stop reason: HOSPADM

## 2020-01-10 RX ORDER — INSULIN LISPRO 100 [IU]/ML
INJECTION, SOLUTION INTRAVENOUS; SUBCUTANEOUS ONCE
Status: COMPLETED | OUTPATIENT
Start: 2020-01-10 | End: 2020-01-10

## 2020-01-10 RX ORDER — CLINDAMYCIN PHOSPHATE 900 MG/50ML
900 INJECTION INTRAVENOUS EVERY 8 HOURS
Status: DISCONTINUED | OUTPATIENT
Start: 2020-01-10 | End: 2020-01-11 | Stop reason: HOSPADM

## 2020-01-10 RX ORDER — LABETALOL HCL 20 MG/4 ML
SYRINGE (ML) INTRAVENOUS
Status: COMPLETED
Start: 2020-01-10 | End: 2020-01-10

## 2020-01-10 RX ORDER — HYDROMORPHONE HYDROCHLORIDE 1 MG/ML
0.5 INJECTION, SOLUTION INTRAMUSCULAR; INTRAVENOUS; SUBCUTANEOUS AS NEEDED
Status: DISCONTINUED | OUTPATIENT
Start: 2020-01-10 | End: 2020-01-10 | Stop reason: HOSPADM

## 2020-01-10 RX ORDER — HYDROMORPHONE HYDROCHLORIDE 1 MG/ML
1 INJECTION, SOLUTION INTRAMUSCULAR; INTRAVENOUS; SUBCUTANEOUS
Status: DISCONTINUED | OUTPATIENT
Start: 2020-01-10 | End: 2020-01-11 | Stop reason: HOSPADM

## 2020-01-10 RX ORDER — HYDRALAZINE HYDROCHLORIDE 20 MG/ML
INJECTION INTRAMUSCULAR; INTRAVENOUS
Status: DISPENSED
Start: 2020-01-10 | End: 2020-01-11

## 2020-01-10 RX ORDER — SODIUM CHLORIDE AND POTASSIUM CHLORIDE .9; .15 G/100ML; G/100ML
SOLUTION INTRAVENOUS CONTINUOUS
Status: DISCONTINUED | OUTPATIENT
Start: 2020-01-10 | End: 2020-01-10

## 2020-01-10 RX ORDER — INSULIN LISPRO 100 [IU]/ML
INJECTION, SOLUTION INTRAVENOUS; SUBCUTANEOUS
Status: COMPLETED
Start: 2020-01-10 | End: 2020-01-10

## 2020-01-10 RX ORDER — ALBUTEROL SULFATE 0.83 MG/ML
2.5 SOLUTION RESPIRATORY (INHALATION) AS NEEDED
Status: DISCONTINUED | OUTPATIENT
Start: 2020-01-10 | End: 2020-01-10 | Stop reason: HOSPADM

## 2020-01-10 RX ORDER — ONDANSETRON 2 MG/ML
4 INJECTION INTRAMUSCULAR; INTRAVENOUS
Status: DISCONTINUED | OUTPATIENT
Start: 2020-01-10 | End: 2020-01-11 | Stop reason: HOSPADM

## 2020-01-10 RX ORDER — ACETAMINOPHEN 500 MG
500 TABLET ORAL
Status: DISCONTINUED | OUTPATIENT
Start: 2020-01-10 | End: 2020-01-11 | Stop reason: HOSPADM

## 2020-01-10 RX ORDER — HYDROMORPHONE HYDROCHLORIDE 1 MG/ML
1 INJECTION, SOLUTION INTRAMUSCULAR; INTRAVENOUS; SUBCUTANEOUS
Status: DISCONTINUED | OUTPATIENT
Start: 2020-01-10 | End: 2020-01-10

## 2020-01-10 RX ORDER — DIPHENHYDRAMINE HCL 25 MG
CAPSULE ORAL
Status: DISPENSED
Start: 2020-01-10 | End: 2020-01-11

## 2020-01-10 RX ORDER — AMLODIPINE BESYLATE 10 MG/1
10 TABLET ORAL DAILY
Status: DISCONTINUED | OUTPATIENT
Start: 2020-01-11 | End: 2020-01-11 | Stop reason: HOSPADM

## 2020-01-10 RX ORDER — NALOXONE HYDROCHLORIDE 0.4 MG/ML
0.4 INJECTION, SOLUTION INTRAMUSCULAR; INTRAVENOUS; SUBCUTANEOUS AS NEEDED
Status: DISCONTINUED | OUTPATIENT
Start: 2020-01-10 | End: 2020-01-11 | Stop reason: HOSPADM

## 2020-01-10 RX ORDER — ONDANSETRON 2 MG/ML
4 INJECTION INTRAMUSCULAR; INTRAVENOUS ONCE
Status: DISCONTINUED | OUTPATIENT
Start: 2020-01-10 | End: 2020-01-10 | Stop reason: HOSPADM

## 2020-01-10 RX ORDER — MIDAZOLAM HYDROCHLORIDE 1 MG/ML
INJECTION, SOLUTION INTRAMUSCULAR; INTRAVENOUS AS NEEDED
Status: DISCONTINUED | OUTPATIENT
Start: 2020-01-10 | End: 2020-01-10 | Stop reason: HOSPADM

## 2020-01-10 RX ORDER — NALOXONE HYDROCHLORIDE 0.4 MG/ML
0.04 INJECTION, SOLUTION INTRAMUSCULAR; INTRAVENOUS; SUBCUTANEOUS AS NEEDED
Status: DISCONTINUED | OUTPATIENT
Start: 2020-01-10 | End: 2020-01-10 | Stop reason: HOSPADM

## 2020-01-10 RX ORDER — DIPHENHYDRAMINE HYDROCHLORIDE 50 MG/ML
12.5 INJECTION, SOLUTION INTRAMUSCULAR; INTRAVENOUS
Status: DISCONTINUED | OUTPATIENT
Start: 2020-01-10 | End: 2020-01-10 | Stop reason: HOSPADM

## 2020-01-10 RX ORDER — FAMOTIDINE 20 MG/1
20 TABLET, FILM COATED ORAL
Status: COMPLETED | OUTPATIENT
Start: 2020-01-10 | End: 2020-01-10

## 2020-01-10 RX ORDER — DIPHENHYDRAMINE HCL 25 MG
25 CAPSULE ORAL
Status: DISCONTINUED | OUTPATIENT
Start: 2020-01-10 | End: 2020-01-11 | Stop reason: HOSPADM

## 2020-01-10 RX ORDER — LABETALOL HCL 20 MG/4 ML
5 SYRINGE (ML) INTRAVENOUS ONCE
Status: COMPLETED | OUTPATIENT
Start: 2020-01-10 | End: 2020-01-10

## 2020-01-10 RX ORDER — HYDROMORPHONE HYDROCHLORIDE 2 MG/ML
0.5 INJECTION, SOLUTION INTRAMUSCULAR; INTRAVENOUS; SUBCUTANEOUS
Status: DISCONTINUED | OUTPATIENT
Start: 2020-01-10 | End: 2020-01-10

## 2020-01-10 RX ORDER — AMLODIPINE BESYLATE 10 MG/1
10 TABLET ORAL
Status: COMPLETED | OUTPATIENT
Start: 2020-01-10 | End: 2020-01-10

## 2020-01-10 RX ORDER — MAGNESIUM SULFATE 100 %
4 CRYSTALS MISCELLANEOUS AS NEEDED
Status: DISCONTINUED | OUTPATIENT
Start: 2020-01-10 | End: 2020-01-11 | Stop reason: HOSPADM

## 2020-01-10 RX ORDER — HYDROCODONE BITARTRATE AND ACETAMINOPHEN 10; 325 MG/1; MG/1
TABLET ORAL
Status: DISPENSED
Start: 2020-01-10 | End: 2020-01-11

## 2020-01-10 RX ORDER — LIDOCAINE HYDROCHLORIDE 20 MG/ML
INJECTION, SOLUTION EPIDURAL; INFILTRATION; INTRACAUDAL; PERINEURAL AS NEEDED
Status: DISCONTINUED | OUTPATIENT
Start: 2020-01-10 | End: 2020-01-10 | Stop reason: HOSPADM

## 2020-01-10 RX ORDER — MAGNESIUM SULFATE 100 %
4 CRYSTALS MISCELLANEOUS AS NEEDED
Status: DISCONTINUED | OUTPATIENT
Start: 2020-01-10 | End: 2020-01-10 | Stop reason: HOSPADM

## 2020-01-10 RX ORDER — CLINDAMYCIN PHOSPHATE 900 MG/50ML
INJECTION INTRAVENOUS
Status: COMPLETED
Start: 2020-01-10 | End: 2020-01-10

## 2020-01-10 RX ORDER — CLINDAMYCIN PHOSPHATE 900 MG/50ML
INJECTION INTRAVENOUS
Status: DISPENSED
Start: 2020-01-10 | End: 2020-01-11

## 2020-01-10 RX ORDER — CLINDAMYCIN PHOSPHATE 600 MG/50ML
600 INJECTION INTRAVENOUS EVERY 8 HOURS
Status: DISCONTINUED | OUTPATIENT
Start: 2020-01-10 | End: 2020-01-10

## 2020-01-10 RX ORDER — INSULIN LISPRO 100 [IU]/ML
INJECTION, SOLUTION INTRAVENOUS; SUBCUTANEOUS
Status: DISCONTINUED | OUTPATIENT
Start: 2020-01-11 | End: 2020-01-11 | Stop reason: HOSPADM

## 2020-01-10 RX ORDER — HYDRALAZINE HYDROCHLORIDE 20 MG/ML
20 INJECTION INTRAMUSCULAR; INTRAVENOUS
Status: DISCONTINUED | OUTPATIENT
Start: 2020-01-10 | End: 2020-01-11 | Stop reason: HOSPADM

## 2020-01-10 RX ORDER — HYDROMORPHONE HYDROCHLORIDE 1 MG/ML
INJECTION, SOLUTION INTRAMUSCULAR; INTRAVENOUS; SUBCUTANEOUS
Status: DISPENSED
Start: 2020-01-10 | End: 2020-01-11

## 2020-01-10 RX ORDER — CEFAZOLIN SODIUM 2 G/50ML
2 SOLUTION INTRAVENOUS
Status: DISCONTINUED | OUTPATIENT
Start: 2020-01-10 | End: 2020-01-10 | Stop reason: HOSPADM

## 2020-01-10 RX ORDER — FENTANYL CITRATE 50 UG/ML
50 INJECTION, SOLUTION INTRAMUSCULAR; INTRAVENOUS
Status: DISCONTINUED | OUTPATIENT
Start: 2020-01-10 | End: 2020-01-10 | Stop reason: HOSPADM

## 2020-01-10 RX ORDER — SODIUM CHLORIDE 0.9 % (FLUSH) 0.9 %
5-40 SYRINGE (ML) INJECTION EVERY 8 HOURS
Status: DISCONTINUED | OUTPATIENT
Start: 2020-01-10 | End: 2020-01-10 | Stop reason: HOSPADM

## 2020-01-10 RX ORDER — CITALOPRAM 20 MG/1
20 TABLET, FILM COATED ORAL DAILY
Status: DISCONTINUED | OUTPATIENT
Start: 2020-01-11 | End: 2020-01-11 | Stop reason: HOSPADM

## 2020-01-10 RX ORDER — SODIUM CHLORIDE 0.9 % (FLUSH) 0.9 %
5-40 SYRINGE (ML) INJECTION EVERY 8 HOURS
Status: DISCONTINUED | OUTPATIENT
Start: 2020-01-10 | End: 2020-01-11 | Stop reason: HOSPADM

## 2020-01-10 RX ORDER — SODIUM CHLORIDE 0.9 % (FLUSH) 0.9 %
5-40 SYRINGE (ML) INJECTION AS NEEDED
Status: DISCONTINUED | OUTPATIENT
Start: 2020-01-10 | End: 2020-01-11 | Stop reason: HOSPADM

## 2020-01-10 RX ORDER — SODIUM CHLORIDE, SODIUM LACTATE, POTASSIUM CHLORIDE, CALCIUM CHLORIDE 600; 310; 30; 20 MG/100ML; MG/100ML; MG/100ML; MG/100ML
50 INJECTION, SOLUTION INTRAVENOUS CONTINUOUS
Status: DISCONTINUED | OUTPATIENT
Start: 2020-01-10 | End: 2020-01-10 | Stop reason: HOSPADM

## 2020-01-10 RX ADMIN — GLYCOPYRROLATE 0.2 MG: 0.2 INJECTION INTRAMUSCULAR; INTRAVENOUS at 13:53

## 2020-01-10 RX ADMIN — LABETALOL HYDROCHLORIDE 5 MG: 5 INJECTION, SOLUTION INTRAVENOUS at 18:26

## 2020-01-10 RX ADMIN — INSULIN LISPRO 3 UNITS: 100 INJECTION, SOLUTION INTRAVENOUS; SUBCUTANEOUS at 17:14

## 2020-01-10 RX ADMIN — SODIUM CHLORIDE, SODIUM LACTATE, POTASSIUM CHLORIDE, AND CALCIUM CHLORIDE: 600; 310; 30; 20 INJECTION, SOLUTION INTRAVENOUS at 15:01

## 2020-01-10 RX ADMIN — SUCCINYLCHOLINE CHLORIDE 100 MG: 20 INJECTION, SOLUTION INTRAMUSCULAR; INTRAVENOUS at 13:07

## 2020-01-10 RX ADMIN — SODIUM CHLORIDE, SODIUM LACTATE, POTASSIUM CHLORIDE, AND CALCIUM CHLORIDE 125 ML/HR: 600; 310; 30; 20 INJECTION, SOLUTION INTRAVENOUS at 11:00

## 2020-01-10 RX ADMIN — HYDROMORPHONE HYDROCHLORIDE 0.5 MG: 1 INJECTION, SOLUTION INTRAMUSCULAR; INTRAVENOUS; SUBCUTANEOUS at 17:29

## 2020-01-10 RX ADMIN — SODIUM CHLORIDE AND POTASSIUM CHLORIDE: .9; .15 SOLUTION INTRAVENOUS at 20:19

## 2020-01-10 RX ADMIN — FENTANYL CITRATE 100 MCG: 50 INJECTION, SOLUTION INTRAMUSCULAR; INTRAVENOUS at 13:05

## 2020-01-10 RX ADMIN — HYDRALAZINE HYDROCHLORIDE 20 MG: 20 INJECTION INTRAMUSCULAR; INTRAVENOUS at 20:19

## 2020-01-10 RX ADMIN — REMIFENTANIL HYDROCHLORIDE 0.05 MCG/KG/MIN: 1 INJECTION, POWDER, LYOPHILIZED, FOR SOLUTION INTRAVENOUS at 13:30

## 2020-01-10 RX ADMIN — PROPOFOL 150 MG: 10 INJECTION, EMULSION INTRAVENOUS at 13:07

## 2020-01-10 RX ADMIN — SODIUM CHLORIDE, SODIUM LACTATE, POTASSIUM CHLORIDE, AND CALCIUM CHLORIDE: 600; 310; 30; 20 INJECTION, SOLUTION INTRAVENOUS at 13:00

## 2020-01-10 RX ADMIN — DEXAMETHASONE SODIUM PHOSPHATE 10 MG: 4 INJECTION, SOLUTION INTRA-ARTICULAR; INTRALESIONAL; INTRAMUSCULAR; INTRAVENOUS; SOFT TISSUE at 13:16

## 2020-01-10 RX ADMIN — REMIFENTANIL HYDROCHLORIDE: 1 INJECTION, POWDER, LYOPHILIZED, FOR SOLUTION INTRAVENOUS at 14:50

## 2020-01-10 RX ADMIN — AMLODIPINE BESYLATE 10 MG: 10 TABLET ORAL at 18:42

## 2020-01-10 RX ADMIN — HYDROMORPHONE HYDROCHLORIDE 0.5 MG: 1 INJECTION, SOLUTION INTRAMUSCULAR; INTRAVENOUS; SUBCUTANEOUS at 17:07

## 2020-01-10 RX ADMIN — PROPOFOL: 10 INJECTION, EMULSION INTRAVENOUS at 14:50

## 2020-01-10 RX ADMIN — ONDANSETRON 4 MG: 2 SOLUTION INTRAMUSCULAR; INTRAVENOUS at 13:16

## 2020-01-10 RX ADMIN — PHENYLEPHRINE HYDROCHLORIDE 20 MCG/MIN: 10 INJECTION INTRAVENOUS at 13:30

## 2020-01-10 RX ADMIN — DIPHENHYDRAMINE HYDROCHLORIDE 25 MG: 25 CAPSULE ORAL at 21:31

## 2020-01-10 RX ADMIN — SODIUM CHLORIDE, SODIUM LACTATE, POTASSIUM CHLORIDE, AND CALCIUM CHLORIDE: 600; 310; 30; 20 INJECTION, SOLUTION INTRAVENOUS at 13:20

## 2020-01-10 RX ADMIN — FAMOTIDINE 20 MG: 20 TABLET ORAL at 11:00

## 2020-01-10 RX ADMIN — PROPOFOL 75 MCG/KG/MIN: 10 INJECTION, EMULSION INTRAVENOUS at 13:30

## 2020-01-10 RX ADMIN — Medication 5 MG: at 18:26

## 2020-01-10 RX ADMIN — LIDOCAINE HYDROCHLORIDE 100 MG: 20 INJECTION, SOLUTION INTRAVENOUS at 13:07

## 2020-01-10 RX ADMIN — GLYCOPYRROLATE 0.2 MG: 0.2 INJECTION INTRAMUSCULAR; INTRAVENOUS at 13:16

## 2020-01-10 RX ADMIN — CLINDAMYCIN PHOSPHATE 900 MG: 900 INJECTION, SOLUTION INTRAVENOUS at 22:44

## 2020-01-10 RX ADMIN — CLINDAMYCIN PHOSPHATE 900 MG: 900 INJECTION INTRAVENOUS at 13:00

## 2020-01-10 RX ADMIN — HYDROCODONE BITARTRATE AND ACETAMINOPHEN 1 TABLET: 10; 325 TABLET ORAL at 21:31

## 2020-01-10 RX ADMIN — HYDROMORPHONE HYDROCHLORIDE 1 MG: 1 INJECTION, SOLUTION INTRAMUSCULAR; INTRAVENOUS; SUBCUTANEOUS at 23:40

## 2020-01-10 RX ADMIN — MIDAZOLAM 2 MG: 1 INJECTION INTRAMUSCULAR; INTRAVENOUS at 13:00

## 2020-01-10 RX ADMIN — SODIUM CHLORIDE 125 ML/HR: 900 INJECTION, SOLUTION INTRAVENOUS at 22:44

## 2020-01-10 RX ADMIN — FENTANYL CITRATE 50 MCG: 50 INJECTION, SOLUTION INTRAMUSCULAR; INTRAVENOUS at 18:17

## 2020-01-10 RX ADMIN — INSULIN GLARGINE 15 UNITS: 100 INJECTION, SOLUTION SUBCUTANEOUS at 21:40

## 2020-01-10 NOTE — ANESTHESIA POSTPROCEDURE EVALUATION
Procedure(s):  ANTERIOR CERVICAL Discectomy and  FUSION WITH  bone bank bone graft, plate T2-Y4. general    Anesthesia Post Evaluation      Multimodal analgesia: multimodal analgesia used between 6 hours prior to anesthesia start to PACU discharge  Patient location during evaluation: bedside  Patient participation: complete - patient participated  Level of consciousness: awake  Pain management: adequate  Airway patency: patent  Anesthetic complications: no  Cardiovascular status: stable  Respiratory status: acceptable  Hydration status: acceptable  Post anesthesia nausea and vomiting:  controlled      Vitals Value Taken Time   /75 1/10/2020  5:54 PM   Temp     Pulse 85 1/10/2020  5:57 PM   Resp 13 1/10/2020  5:57 PM   SpO2 93 % 1/10/2020  5:57 PM   Vitals shown include unvalidated device data.

## 2020-01-10 NOTE — OP NOTES
BRIEF OPERATIVE NOTE    Date of Procedure: 1/10/2020     Preoperative Diagnosis: c3-4 herniated nucleus pulposus   m50.20    Postoperative Diagnosis: c3-4 herniated nucleus pulposus   m50.20      Procedure: Procedure(s):  ANTERIOR CERVICAL Discectomy and  FUSION WITH  bone bank bone graft, plate R2-I6    Surgeon(s) and Role: Shaquille Jean-Baptiste MD - Primary    Anesthesia: General    Findings: ddd central stenosis c3-4     Estimated Blood Loss: 50  Replaced0      [id number]  Urine no vicente    Specimens: * No specimens in log *     Tubes/Drains: None    Needle/sponge count:  Correct    Complications: None. No change in neuro monitoring. In RR upper and lower extremity motor intact bilat. Plan    Due to hour of the day will admit overnight and home in am with collar and percocet    rto 2 wks. /

## 2020-01-10 NOTE — PERIOP NOTES
Recd care of pt from OR via bed. Attached to monitor. VSS. OR, MAR and anesthesia report acknowledged. Will cont to monitor. [address]   Thetk Six notified of consult. 1900 TRANSFER - OUT REPORT:    Verbal report given to Rosy Gutierrez RN (name) on Trudee List  being transferred to 2200 (unit) for routine post - op       Report consisted of patients Situation, Background, Assessment and   Recommendations(SBAR). Information from the following report(s) SBAR, Kardex, OR Summary, MAR, Recent Results and Cardiac Rhythm sinus rhythm was reviewed with the receiving nurse. Lines:   Peripheral IV 01/10/20 Right Hand (Active)   Site Assessment Clean, dry, & intact 1/10/2020 11:05 AM   Phlebitis Assessment 0 1/10/2020 11:05 AM   Infiltration Assessment 0 1/10/2020 11:05 AM   Dressing Status Clean, dry, & intact 1/10/2020 11:05 AM   Dressing Type Tape;Transparent 1/10/2020 11:05 AM   Hub Color/Line Status Blue; Infusing 1/10/2020 11:05 AM   Alcohol Cap Used Yes 1/10/2020 11:05 AM        Opportunity for questions and clarification was provided.       Patient transported with:   [employer]

## 2020-01-10 NOTE — PERIOP NOTES
Pre-Op Summary    Pt arrived via car with family/friend and is oriented to time, place, person and situation. Patient with steady gait with none assistive devices. Visit Vitals  /75 (BP 1 Location: Right arm, BP Patient Position: At rest)   Pulse 74   Temp 98.7 °F (37.1 °C)   Resp 16   Ht 5' 4\" (1.626 m)   Wt 92.5 kg (204 lb)   SpO2 98%   BMI 35.02 kg/m²       Peripheral IV located on Right hand . Patients belongings are located with . Patient's point of contact is Janelle Loo and their contact number is: [phone]. They will be in the waiting room. They are able to receive medication information. They will be their ride home.

## 2020-01-10 NOTE — ANESTHESIA PREPROCEDURE EVALUATION
Relevant Problems   No relevant active problems       Anesthetic History   No history of anesthetic complications            Review of Systems / Medical History  Patient summary reviewed, nursing notes reviewed and pertinent labs reviewed    Pulmonary        Sleep apnea           Neuro/Psych         Headaches and psychiatric history (ANXIETY, DEPRESSION)     Cardiovascular    Hypertension  Valvular problems/murmurs        Hyperlipidemia      Comments: PATIENT IS A [employer] AND WILL NOT ACCEPT HOMOLOGOUS BLOOD, OR BLOOD PRODUCT.  SHE ACCEPTS CELL SAVER, EPB, PLASMA EXPANDERS LIKE HETASTARCH     GI/Hepatic/Renal     GERD           Endo/Other    Diabetes: type 2    Morbid obesity and arthritis     Other Findings   Comments: surgery (Z09)    Diabetes ([address] (E11.9)    Hypertension (I10)    Cancer ([address] (C80.1)  Left Breast  Colon polyps (K63.5)    Ankle swelling (M25.473)    Heart murmur (R01.1)    Weight gain (R63.5)    Arthritis (M19.90)    Joint pain (M25.50)    Breast cancer ([facility] (C50.919) 2003 Lt breast  Chemotherapy 2003 6 weeks for lt breast  Renal cyst (N28.1)    Hematuria (R31.9)    Menopause (Z78.0)    Globus syndrome (R09.89)    Sleep apnea (G47.30)  Unable to wear CPAP  Psychiatric disorder (F99)  DEPRESSION, ANXIETY  GERD (gastroesophageal reflux disease) (K21.9)             Physical Exam    Airway  Mallampati: III  TM Distance: 4 - 6 cm  Neck ROM: normal range of motion, short neck   Mouth opening: Normal     Cardiovascular    Rhythm: regular  Rate: normal         Dental  No notable dental hx       Pulmonary  Breath sounds clear to auscultation               Abdominal         Other Findings            Anesthetic Plan    ASA: 3  Anesthesia type: general          Induction: Intravenous  Anesthetic plan and risks discussed with: Patient    Sherial Snare / NO HOMOLOGOUS BLOOD ISSUE ADDRESSED

## 2020-01-11 VITALS
RESPIRATION RATE: 18 BRPM | HEIGHT: 64 IN | TEMPERATURE: 97.3 F | DIASTOLIC BLOOD PRESSURE: 75 MMHG | BODY MASS INDEX: 34.83 KG/M2 | SYSTOLIC BLOOD PRESSURE: 152 MMHG | WEIGHT: 204 LBS | HEART RATE: 88 BPM | OXYGEN SATURATION: 97 %

## 2020-01-11 LAB
ANION GAP SERPL CALC-SCNC: 8 MMOL/L (ref 3–18)
BUN SERPL-MCNC: 19 MG/DL (ref 7–18)
BUN/CREAT SERPL: 16 (ref 12–20)
CALCIUM SERPL-MCNC: 8.5 MG/DL (ref 8.5–10.1)
CHLORIDE SERPL-SCNC: 103 MMOL/L (ref 100–111)
CO2 SERPL-SCNC: 24 MMOL/L (ref 21–32)
CREAT SERPL-MCNC: 1.19 MG/DL (ref 0.6–1.3)
EST. AVERAGE GLUCOSE BLD GHB EST-MCNC: 174 MG/DL
GLUCOSE BLD STRIP.AUTO-MCNC: 252 MG/DL (ref 70–110)
GLUCOSE BLD STRIP.AUTO-MCNC: 302 MG/DL (ref 70–110)
GLUCOSE SERPL-MCNC: 336 MG/DL (ref 74–99)
HBA1C MFR BLD: 7.7 % (ref 4.2–5.6)
POTASSIUM SERPL-SCNC: 4.1 MMOL/L (ref 3.5–5.5)
SODIUM SERPL-SCNC: 135 MMOL/L (ref 136–145)

## 2020-01-11 PROCEDURE — 74011636637 HC RX REV CODE- 636/637: Performed by: INTERNAL MEDICINE

## 2020-01-11 PROCEDURE — 74011250636 HC RX REV CODE- 250/636: Performed by: ORTHOPAEDIC SURGERY

## 2020-01-11 PROCEDURE — 36415 COLL VENOUS BLD VENIPUNCTURE: CPT

## 2020-01-11 PROCEDURE — 97530 THERAPEUTIC ACTIVITIES: CPT

## 2020-01-11 PROCEDURE — 74011000250 HC RX REV CODE- 250: Performed by: ORTHOPAEDIC SURGERY

## 2020-01-11 PROCEDURE — 83036 HEMOGLOBIN GLYCOSYLATED A1C: CPT

## 2020-01-11 PROCEDURE — 74011250637 HC RX REV CODE- 250/637: Performed by: INTERNAL MEDICINE

## 2020-01-11 PROCEDURE — 77010033678 HC OXYGEN DAILY

## 2020-01-11 PROCEDURE — 74011250636 HC RX REV CODE- 250/636: Performed by: INTERNAL MEDICINE

## 2020-01-11 PROCEDURE — 82962 GLUCOSE BLOOD TEST: CPT

## 2020-01-11 PROCEDURE — 80048 BASIC METABOLIC PNL TOTAL CA: CPT

## 2020-01-11 PROCEDURE — 97161 PT EVAL LOW COMPLEX 20 MIN: CPT

## 2020-01-11 RX ORDER — NALOXONE HYDROCHLORIDE 4 MG/.1ML
SPRAY NASAL
Qty: 1 EACH | Refills: 0 | Status: SHIPPED | OUTPATIENT
Start: 2020-01-11

## 2020-01-11 RX ORDER — HYDROMORPHONE HYDROCHLORIDE 1 MG/ML
INJECTION, SOLUTION INTRAMUSCULAR; INTRAVENOUS; SUBCUTANEOUS
Status: DISPENSED
Start: 2020-01-11 | End: 2020-01-12

## 2020-01-11 RX ORDER — HYDROCODONE BITARTRATE AND ACETAMINOPHEN 10; 325 MG/1; MG/1
1 TABLET ORAL
Qty: 32 TAB | Refills: 0 | Status: SHIPPED | OUTPATIENT
Start: 2020-01-11 | End: 2020-01-18

## 2020-01-11 RX ORDER — HYDROMORPHONE HYDROCHLORIDE 1 MG/ML
INJECTION, SOLUTION INTRAMUSCULAR; INTRAVENOUS; SUBCUTANEOUS
Status: DISCONTINUED
Start: 2020-01-11 | End: 2020-01-11 | Stop reason: HOSPADM

## 2020-01-11 RX ORDER — INSULIN LISPRO 100 [IU]/ML
INJECTION, SOLUTION INTRAVENOUS; SUBCUTANEOUS
Status: DISPENSED
Start: 2020-01-11 | End: 2020-01-11

## 2020-01-11 RX ORDER — HYDROCODONE BITARTRATE AND ACETAMINOPHEN 10; 325 MG/1; MG/1
TABLET ORAL
Status: DISPENSED
Start: 2020-01-11 | End: 2020-01-11

## 2020-01-11 RX ORDER — CLINDAMYCIN PHOSPHATE 900 MG/50ML
INJECTION INTRAVENOUS
Status: DISPENSED
Start: 2020-01-11 | End: 2020-01-11

## 2020-01-11 RX ORDER — CITALOPRAM 20 MG/1
TABLET, FILM COATED ORAL
Status: DISPENSED
Start: 2020-01-11 | End: 2020-01-11

## 2020-01-11 RX ORDER — AMLODIPINE BESYLATE 10 MG/1
TABLET ORAL
Status: DISPENSED
Start: 2020-01-11 | End: 2020-01-11

## 2020-01-11 RX ADMIN — HYDROMORPHONE HYDROCHLORIDE 1 MG: 1 INJECTION, SOLUTION INTRAMUSCULAR; INTRAVENOUS; SUBCUTANEOUS at 13:35

## 2020-01-11 RX ADMIN — HYDROCODONE BITARTRATE AND ACETAMINOPHEN 1 TABLET: 10; 325 TABLET ORAL at 03:21

## 2020-01-11 RX ADMIN — HYDROMORPHONE HYDROCHLORIDE 1 MG: 1 INJECTION, SOLUTION INTRAMUSCULAR; INTRAVENOUS; SUBCUTANEOUS at 06:43

## 2020-01-11 RX ADMIN — INSULIN LISPRO 8 UNITS: 100 INJECTION, SOLUTION INTRAVENOUS; SUBCUTANEOUS at 10:10

## 2020-01-11 RX ADMIN — SODIUM CHLORIDE 10 ML: 9 INJECTION, SOLUTION INTRAMUSCULAR; INTRAVENOUS; SUBCUTANEOUS at 13:35

## 2020-01-11 RX ADMIN — AMLODIPINE BESYLATE 10 MG: 10 TABLET ORAL at 10:11

## 2020-01-11 RX ADMIN — CLINDAMYCIN PHOSPHATE 900 MG: 900 INJECTION, SOLUTION INTRAVENOUS at 04:40

## 2020-01-11 NOTE — PROGRESS NOTES
Problem: Discharge Planning  Goal: *Discharge to safe environment  Outcome: Resolved/Met   Plan home, poss     Reason for Admission:   Cervical fusion                   RRAT Score:  10                   Plan for utilizing home health:     poss                     Current Advanced Directive/Advance Care Plan: on file , first agent, spouse,nicola                         Transition of Care Plan:   Spoke with pt, lives with spouse. Independent with adls, amb with a cane. Has cpap, no other dme. Has pcp in [place] [id number] but cannot recall name Keely Velarde is no longer pcp. Waiting for therapy for dme recommendations. Demographics correct   Spouse to transport home. Patient has designated __spouse______________________ to participate in his/her discharge plan and to receive any needed information. Name: Riddhi Ortiz  Address:  Phone number: [phone] [place] Management Interventions  PCP Verified by CM: Yes  Palliative Care Criteria Met (RRAT>21 & CHF Dx)?: No  Mode of Transport at Discharge:  Other (see comment)  Transition of Care Consult (CM Consult): Discharge Planning  Discharge Durable Medical Equipment: No  Physical Therapy Consult: Yes  Occupational Therapy Consult: Yes  Speech Therapy Consult: No  Current Support Network: Lives with Spouse  Confirm Follow Up Transport: Family  Discharge Location  Discharge Placement: Home

## 2020-01-11 NOTE — DISCHARGE INSTRUCTIONS
Patient Education        Cervical Laminectomy: What to Expect at [address] had a cervical laminectomy to relieve pressure on your spinal cord and the nerves in your neck. Your neck will probably feel stiff or sore. This should improve in the weeks after surgery. You may need pain medicine in the weeks after your surgery. Your doctor may recommend that you work with a physical therapist to strengthen the muscles around your neck and spine. You will need to be careful about what activities you do so you don't put too much strain on your neck. This care sheet gives you a general idea about how long it will take for you to recover. But each person recovers at a different pace. Follow the steps below to get better as quickly as possible. How can you care for yourself at home? Activity    · Your doctor may give you specific instructions on when you can do your normal activities again, such as driving and going back to work.     · Be active. Walking is a good choice.     · Rest when you feel tired.     · Allow your body to heal. Don't move quickly or lift anything heavy until you are feeling better.     · You will probably need to take 4 to 6 weeks off from work. It depends on the type of work you do and how you feel. Diet    · You can eat your normal diet. If your stomach is upset, try bland, low-fat foods like plain rice, broiled chicken, toast, and yogurt.     · If your bowel movements are not regular right after surgery, try to avoid constipation and straining. Drink plenty of water. Your doctor may suggest fiber, a stool softener, or a mild laxative. Medicines    · Your doctor will tell you if and when you can restart your medicines. He or she will also give you instructions about taking any new medicines.     · If you take aspirin or some other blood thinner, be sure to talk to your doctor. He or she will tell you if and when to start taking this medicine again.  Make sure that you understand exactly what your doctor wants you to do.     · Be safe with medicines. Read and follow all instructions on the label. ? If the doctor gave you a prescription medicine for pain, take it as prescribed. ? If you are not taking a prescription pain medicine, ask your doctor if you can take an over-the-counter medicine. Incision care    · If you have strips of tape on the cut (incision) the doctor made, leave the tape on for a week or until it falls off.     · Wash the area daily with warm water, and pat it dry. Don't use hydrogen peroxide or alcohol. They can slow healing.     · You may cover the area with a gauze bandage if it oozes fluid or rubs against clothing or your brace. Change the bandage every day. Exercise    · Do neck exercises as instructed by your doctor.     · Your doctor may advise you to work with a physical therapist to improve the strength and flexibility of your neck. Other instructions    · Follow your doctor's instructions about wearing a brace or collar to support your neck.     · To reduce stiffness and help sore muscles, use a warm water bottle, a heating pad set on low, or a warm cloth on your neck. Do not put heat right over the incision. Do not go to sleep with a heating pad on your skin. Follow-up care is a key part of your treatment and safety. Be sure to make and go to all appointments, and call your doctor if you are having problems. It's also a good idea to know your test results and keep a list of the medicines you take. When should you call for help? Call 911 anytime you think you may need emergency care. For example, call if:    · You passed out (lost consciousness).     · You have severe trouble breathing.     · You are unable to move an arm or a leg at all.     · You have symptoms of a blood clot in your lung (called a pulmonary embolism). These may include:  ? Sudden chest pain. ? Trouble breathing. ?  Coughing up blood.    Call your doctor now or seek immediate medical care if:    · You have new or worse symptoms in your arms or legs. Symptoms may include:  ? Numbness or tingling. ? Weakness. ? Pain.     · You have pain that does not get better after you take pain medicine.     · You have loose stitches, or your incision comes open.     · You bleed through your bandage.     · You have symptoms of infection, such as:  ? Increased pain, swelling, warmth, or redness. ? Red streaks leading from the incision. ? Pus draining from the incision. ? A fever.    Watch closely for changes in your health, and be sure to contact your doctor if:    · You do not have a bowel movement after taking a laxative.     · You are not getting better as expected. Where can you learn more? Go to http://toña-ara.info/. Enter Q557 in the search box to learn more about \"Cervical Laminectomy: What to Expect at Home. \"  Current as of: June 26, 2019  Content Version: 12.2  © 9050-9427 [employer]. Care instructions adapted under license by [employer] (which disclaims liability or warranty for this information). If you have questions about a medical condition or this instruction, always ask your healthcare professional. [place] [address] any warranty or liability for your use of this information. Patient Education        Cervical Spinal Stenosis: Care Instructions  Your Care Instructions    Spinal stenosis is a narrowing of the canal that surrounds the spinal cord and nerve roots. Sometimes bone and other tissue grow into this canal and press on the nerves that branch out from the spinal cord. This can happen as a part of aging. When the narrowing happens in your neck, it's called cervical spinal stenosis. It often causes stiffness, pain, numbness, and weakness in the neck, shoulders, arms, hands, or legs. It can even cause problems with your balance, coordination, and bowel or bladder control. But some people have no symptoms.   You may be able to get relief from the symptoms of spinal stenosis by taking pain medicine. Your doctor may suggest physical therapy and exercises to keep your spine strong and flexible. Some people try steroid shots to reduce swelling. If pain and numbness in your neck, arms, or legs are still so bad that you cannot do your normal activities, you may need surgery. Follow-up care is a key part of your treatment and safety. Be sure to make and go to all appointments, and call your doctor if you are having problems. It's also a good idea to know your test results and keep a list of the medicines you take. How can you care for yourself at home? · Ask your doctor if you can take an over-the-counter pain medicine, such as acetaminophen (Tylenol), ibuprofen (Advil, Motrin), or naproxen (Aleve). Be safe with medicines. Read and follow all instructions on the label. · Do not take two or more pain medicines at the same time unless the doctor told you to. Many pain medicines have acetaminophen, which is Tylenol. Too much acetaminophen (Tylenol) can be harmful. · Change positions often when you are standing or sitting. This may reduce pressure on the spinal cord and its nerves. · When you rest, use pillows or towel rolls to support your neck and head in a comfortable position. · Follow your doctor's instructions about activity. He or she may tell you not to do sports or activities that could injure your neck. · Stretch your neck and shoulders as your doctor or physical therapist recommends. If your doctor says it is okay to do them, these exercises may help:  ? Neck stretches to the side. Keep your shoulders relaxed and slowly tilt your head straight over toward one shoulder. Hold for 15 seconds. Let the weight of your head stretch your muscles. Then do the same toward the other shoulder. ? Neck rotations. Keep your chin level and slowly turn your head to one side. Hold for 15 seconds.  Then do the same to the other side.  ? Shoulder rolls. Roll your shoulders up, then back, and then down in a smooth, circular motion. Repeat several times. When should you call for help? Call 911 anytime you think you may need emergency care. For example, call if:    · You are unable to move an arm or a leg at all.   [facility] your doctor now or seek immediate medical care if:    · You have new or worse symptoms in your arms, legs, belly, or buttocks. Symptoms may include:  ? Numbness or tingling. ? Weakness. ? Pain.     · You lose bladder or bowel control.    Watch closely for changes in your health, and be sure to contact your doctor if:    · You do not get better as expected. Where can you learn more? Go to http://toña-ara.info/. Enter  in the search box to learn more about \"Cervical Spinal Stenosis: Care Instructions. \"  Current as of: June 26, 2019  Content Version: 12.2  © 1173-7656 [employer]. Care instructions adapted under license by [employer] (which disclaims liability or warranty for this information). If you have questions about a medical condition or this instruction, always ask your healthcare professional. [place] [address] any warranty or liability for your use of this information. Patient Education        Cervical Spinal Stenosis: Care Instructions  Your Care Instructions    Spinal stenosis is a narrowing of the canal that surrounds the spinal cord and nerve roots. Sometimes bone and other tissue grow into this canal and press on the nerves that branch out from the spinal cord. This can happen as a part of aging. When the narrowing happens in your neck, it's called cervical spinal stenosis. It often causes stiffness, pain, numbness, and weakness in the neck, shoulders, arms, hands, or legs. It can even cause problems with your balance, coordination, and bowel or bladder control. But some people have no symptoms.   You may be able to get relief from the symptoms of spinal stenosis by taking pain medicine. Your doctor may suggest physical therapy and exercises to keep your spine strong and flexible. Some people try steroid shots to reduce swelling. If pain and numbness in your neck, arms, or legs are still so bad that you cannot do your normal activities, you may need surgery. Follow-up care is a key part of your treatment and safety. Be sure to make and go to all appointments, and call your doctor if you are having problems. It's also a good idea to know your test results and keep a list of the medicines you take. How can you care for yourself at home? · Ask your doctor if you can take an over-the-counter pain medicine, such as acetaminophen (Tylenol), ibuprofen (Advil, Motrin), or naproxen (Aleve). Be safe with medicines. Read and follow all instructions on the label. · Do not take two or more pain medicines at the same time unless the doctor told you to. Many pain medicines have acetaminophen, which is Tylenol. Too much acetaminophen (Tylenol) can be harmful. · Change positions often when you are standing or sitting. This may reduce pressure on the spinal cord and its nerves. · When you rest, use pillows or towel rolls to support your neck and head in a comfortable position. · Follow your doctor's instructions about activity. He or she may tell you not to do sports or activities that could injure your neck. · Stretch your neck and shoulders as your doctor or physical therapist recommends. If your doctor says it is okay to do them, these exercises may help:  ? Neck stretches to the side. Keep your shoulders relaxed and slowly tilt your head straight over toward one shoulder. Hold for 15 seconds. Let the weight of your head stretch your muscles. Then do the same toward the other shoulder. ? Neck rotations. Keep your chin level and slowly turn your head to one side. Hold for 15 seconds. Then do the same to the other side. ? Shoulder rolls.  Roll your shoulders up, then back, and then down in a smooth, circular motion. Repeat several times. When should you call for help? Call 911 anytime you think you may need emergency care. For example, call if:    · You are unable to move an arm or a leg at all.   [facility] your doctor now or seek immediate medical care if:    · You have new or worse symptoms in your arms, legs, belly, or buttocks. Symptoms may include:  ? Numbness or tingling. ? Weakness. ? Pain.     · You lose bladder or bowel control.    Watch closely for changes in your health, and be sure to contact your doctor if:    · You do not get better as expected. Where can you learn more? Go to http://toñaStageitara.info/. Enter  in the search box to learn more about \"Cervical Spinal Stenosis: Care Instructions. \"  Current as of: June 26, 2019  Content Version: 12.2  © 9910-2188 [employer]. Care instructions adapted under license by [employer] (which disclaims liability or warranty for this information). If you have questions about a medical condition or this instruction, always ask your healthcare professional. [place] [address] any warranty or liability for your use of this information. Patient Education        Cervical Laminectomy: What to Expect at [address] had a cervical laminectomy to relieve pressure on your spinal cord and the nerves in your neck. Your neck will probably feel stiff or sore. This should improve in the weeks after surgery. You may need pain medicine in the weeks after your surgery. Your doctor may recommend that you work with a physical therapist to strengthen the muscles around your neck and spine. You will need to be careful about what activities you do so you don't put too much strain on your neck. This care sheet gives you a general idea about how long it will take for you to recover. But each person recovers at a different pace.  Follow the steps below to get better as quickly as possible. How can you care for yourself at home? Activity    · Your doctor may give you specific instructions on when you can do your normal activities again, such as driving and going back to work.     · Be active. Walking is a good choice.     · Rest when you feel tired.     · Allow your body to heal. Don't move quickly or lift anything heavy until you are feeling better.     · You will probably need to take 4 to 6 weeks off from work. It depends on the type of work you do and how you feel. Diet    · You can eat your normal diet. If your stomach is upset, try bland, low-fat foods like plain rice, broiled chicken, toast, and yogurt.     · If your bowel movements are not regular right after surgery, try to avoid constipation and straining. Drink plenty of water. Your doctor may suggest fiber, a stool softener, or a mild laxative. Medicines    · Your doctor will tell you if and when you can restart your medicines. He or she will also give you instructions about taking any new medicines.     · If you take aspirin or some other blood thinner, be sure to talk to your doctor. He or she will tell you if and when to start taking this medicine again. Make sure that you understand exactly what your doctor wants you to do.     · Be safe with medicines. Read and follow all instructions on the label. ? If the doctor gave you a prescription medicine for pain, take it as prescribed. ? If you are not taking a prescription pain medicine, ask your doctor if you can take an over-the-counter medicine. Incision care    · If you have strips of tape on the cut (incision) the doctor made, leave the tape on for a week or until it falls off.     · Wash the area daily with warm water, and pat it dry. Don't use hydrogen peroxide or alcohol. They can slow healing.     · You may cover the area with a gauze bandage if it oozes fluid or rubs against clothing or your brace.  Change the bandage every day.   Exercise    · Do neck exercises as instructed by your doctor.     · Your doctor may advise you to work with a physical therapist to improve the strength and flexibility of your neck. Other instructions    · Follow your doctor's instructions about wearing a brace or collar to support your neck.     · To reduce stiffness and help sore muscles, use a warm water bottle, a heating pad set on low, or a warm cloth on your neck. Do not put heat right over the incision. Do not go to sleep with a heating pad on your skin. Follow-up care is a key part of your treatment and safety. Be sure to make and go to all appointments, and call your doctor if you are having problems. It's also a good idea to know your test results and keep a list of the medicines you take. When should you call for help? Call 911 anytime you think you may need emergency care. For example, call if:    · You passed out (lost consciousness).     · You have severe trouble breathing.     · You are unable to move an arm or a leg at all.     · You have symptoms of a blood clot in your lung (called a pulmonary embolism). These may include:  ? Sudden chest pain. ? Trouble breathing. ? Coughing up blood.    Call your doctor now or seek immediate medical care if:    · You have new or worse symptoms in your arms or legs. Symptoms may include:  ? Numbness or tingling. ? Weakness. ? Pain.     · You have pain that does not get better after you take pain medicine.     · You have loose stitches, or your incision comes open.     · You bleed through your bandage.     · You have symptoms of infection, such as:  ? Increased pain, swelling, warmth, or redness. ? Red streaks leading from the incision. ? Pus draining from the incision. ? A fever.    Watch closely for changes in your health, and be sure to contact your doctor if:    · You do not have a bowel movement after taking a laxative.     · You are not getting better as expected.    Where can you learn more?  Go to http://toña-ara.info/. Enter Y469 in the search box to learn more about \"Cervical Laminectomy: What to Expect at Home. \"  Current as of: June 26, 2019  Content Version: 12.2  © 9783-7106 [employer]. Care instructions adapted under license by [employer] (which disclaims liability or warranty for this information). If you have questions about a medical condition or this instruction, always ask your healthcare professional. [address] any warranty or liability for your use of this information. Patient Education        Cervical Laminectomy: What to Expect at [address] had a cervical laminectomy to relieve pressure on your spinal cord and the nerves in your neck. Your neck will probably feel stiff or sore. This should improve in the weeks after surgery. You may need pain medicine in the weeks after your surgery. Your doctor may recommend that you work with a physical therapist to strengthen the muscles around your neck and spine. You will need to be careful about what activities you do so you don't put too much strain on your neck. This care sheet gives you a general idea about how long it will take for you to recover. But each person recovers at a different pace. Follow the steps below to get better as quickly as possible. How can you care for yourself at home? Activity    · Your doctor may give you specific instructions on when you can do your normal activities again, such as driving and going back to work.     · Be active. Walking is a good choice.     · Rest when you feel tired.     · Allow your body to heal. Don't move quickly or lift anything heavy until you are feeling better.     · You will probably need to take 4 to 6 weeks off from work. It depends on the type of work you do and how you feel. Diet    · You can eat your normal diet.  If your stomach is upset, try bland, low-fat foods like plain rice, broiled chicken, toast, and yogurt.     · If your bowel movements are not regular right after surgery, try to avoid constipation and straining. Drink plenty of water. Your doctor may suggest fiber, a stool softener, or a mild laxative. Medicines    · Your doctor will tell you if and when you can restart your medicines. He or she will also give you instructions about taking any new medicines.     · If you take aspirin or some other blood thinner, be sure to talk to your doctor. He or she will tell you if and when to start taking this medicine again. Make sure that you understand exactly what your doctor wants you to do.     · Be safe with medicines. Read and follow all instructions on the label. ? If the doctor gave you a prescription medicine for pain, take it as prescribed. ? If you are not taking a prescription pain medicine, ask your doctor if you can take an over-the-counter medicine. Incision care    · If you have strips of tape on the cut (incision) the doctor made, leave the tape on for a week or until it falls off.     · Wash the area daily with warm water, and pat it dry. Don't use hydrogen peroxide or alcohol. They can slow healing.     · You may cover the area with a gauze bandage if it oozes fluid or rubs against clothing or your brace. Change the bandage every day. Exercise    · Do neck exercises as instructed by your doctor.     · Your doctor may advise you to work with a physical therapist to improve the strength and flexibility of your neck. Other instructions    · Follow your doctor's instructions about wearing a brace or collar to support your neck.     · To reduce stiffness and help sore muscles, use a warm water bottle, a heating pad set on low, or a warm cloth on your neck. Do not put heat right over the incision. Do not go to sleep with a heating pad on your skin. Follow-up care is a key part of your treatment and safety.  Be sure to make and go to all appointments, and call your doctor if you are having problems. It's also a good idea to know your test results and keep a list of the medicines you take. When should you call for help? Call 911 anytime you think you may need emergency care. For example, call if:    · You passed out (lost consciousness).     · You have severe trouble breathing.     · You are unable to move an arm or a leg at all.     · You have symptoms of a blood clot in your lung (called a pulmonary embolism). These may include:  ? Sudden chest pain. ? Trouble breathing. ? Coughing up blood.    Call your doctor now or seek immediate medical care if:    · You have new or worse symptoms in your arms or legs. Symptoms may include:  ? Numbness or tingling. ? Weakness. ? Pain.     · You have pain that does not get better after you take pain medicine.     · You have loose stitches, or your incision comes open.     · You bleed through your bandage.     · You have symptoms of infection, such as:  ? Increased pain, swelling, warmth, or redness. ? Red streaks leading from the incision. ? Pus draining from the incision. ? A fever.    Watch closely for changes in your health, and be sure to contact your doctor if:    · You do not have a bowel movement after taking a laxative.     · You are not getting better as expected. Where can you learn more? Go to http://toña-ara.info/. Enter W976 in the search box to learn more about \"Cervical Laminectomy: What to Expect at Home. \"  Current as of: June 26, 2019  Content Version: 12.2  © 8356-0491 [employer]. Care instructions adapted under license by [employer] (which disclaims liability or warranty for this information). If you have questions about a medical condition or this instruction, always ask your healthcare professional. [place] [address] any warranty or liability for your use of this information.          DISCHARGE SUMMARY from Nurse    PATIENT INSTRUCTIONS:    After general anesthesia or intravenous sedation, for 24 hours or while taking prescription Narcotics:  Limit your activities  Do not drive and operate hazardous machinery  Do not make important personal or business decisions  Do  not drink alcoholic beverages  If you have not urinated within 8 hours after discharge, please contact your surgeon on call. Report the following to your surgeon:  Excessive pain, swelling, redness or odor of or around the surgical area  Temperature over 100.5  Nausea and vomiting lasting longer than 4 hours or if unable to take medications  Any signs of decreased circulation or nerve impairment to extremity: change in color, persistent  numbness, tingling, coldness or increase pain  Any questions    What to do at Home:  Recommended activity: See surgical instructions        *  Please give a list of your current medications to your Primary Care Provider. *  Please update this list whenever your medications are discontinued, doses are      changed, or new medications (including over-the-counter products) are added. *  Please carry medication information at all times in case of emergency situations. These are general instructions for a healthy lifestyle:    No smoking/ No tobacco products/ Avoid exposure to second hand smoke  Surgeon General's Warning:  Quitting smoking now greatly reduces serious risk to your health. Obesity, smoking, and sedentary lifestyle greatly increases your risk for illness    A healthy diet, regular physical exercise & weight monitoring are important for maintaining a healthy lifestyle    You may be retaining fluid if you have a history of heart failure or if you experience any of the following symptoms:  Weight gain of 3 pounds or more overnight or 5 pounds in a week, increased swelling in our hands or feet or shortness of breath while lying flat in bed.   Please call your doctor as soon as you notice any of these symptoms; do not wait until your next office visit. Patient armband removed and shredded      The discharge information has been reviewed with the patient. The patient verbalized understanding. Discharge medications reviewed with the patient and appropriate educational materials and side effects teaching were provided.   ___________________________________________________________________________________________________________________________________

## 2020-01-11 NOTE — ROUTINE PROCESS
Assumed care of pt from PACU. Board updated. Assessment completed. No distress voiced/noted. Pain documented. Call bell within reach. Will continue to monitor.

## 2020-01-11 NOTE — PROGRESS NOTES
Problem: Falls - Risk of  Goal: *Absence of Falls  Description  Document Fernanda Diez Fall Risk and appropriate interventions in the flowsheet. 1/11/2020 1839 by Elvira Callahan RN  Outcome: Resolved/Met  Note: Fall Risk Interventions:            Medication Interventions: Patient to call before getting OOB, Teach patient to arise slowly    Elimination Interventions: Patient to call for help with toileting needs, Call light in reach           1/11/2020 1819 by Elvira Callahan RN  Outcome: Progressing Towards Goal  Note: Fall Risk Interventions:            Medication Interventions: Patient to call before getting OOB, Teach patient to arise slowly    Elimination Interventions: Patient to call for help with toileting needs, Call light in reach              Problem: Patient Education: Go to Patient Education Activity  Goal: Patient/Family Education  1/11/2020 1839 by Elvira Callahan RN  Outcome: Resolved/Met  1/11/2020 1819 by Elvira Callahan RN  Outcome: Progressing Towards Goal     Problem: Pain  Goal: *Control of Pain  1/11/2020 1839 by Elvira Callahan RN  Outcome: Resolved/Met  1/11/2020 1819 by Elvira Callahan RN  Outcome: Progressing Towards Goal     Problem: Patient Education: Go to Patient Education Activity  Goal: Patient/Family Education  1/11/2020 1839 by Elvira Callahan RN  Outcome: Resolved/Met  1/11/2020 1819 by Elvira Callahan RN  Outcome: Progressing Towards Goal     Problem: Diabetes Self-Management  Goal: *Disease process and treatment process  Description  Define diabetes and identify own type of diabetes; list 3 options for treating diabetes. 1/11/2020 1839 by Elvira Callahan RN  Outcome: Resolved/Met  1/11/2020 1819 by Elvira Callahan RN  Outcome: Progressing Towards Goal  Goal: *Incorporating nutritional management into lifestyle  Description  Describe effect of type, amount and timing of food on blood glucose; list 3 methods for planning meals.   1/11/2020 1839 by Elvira Callahan RN  Outcome: Resolved/Met  1/11/2020 1819 by Jeffery Buckner RN  Outcome: Progressing Towards Goal  Goal: *Incorporating physical activity into lifestyle  Description  State effect of exercise on blood glucose levels. 1/11/2020 1839 by Jeffery Buckner RN  Outcome: Resolved/Met  1/11/2020 1819 by Jeffery Buckner RN  Outcome: Progressing Towards Goal  Goal: *Developing strategies to promote health/change behavior  Description  Define the ABC's of diabetes; identify appropriate screenings, schedule and personal plan for screenings. 1/11/2020 1839 by Jeffery Buckner RN  Outcome: Resolved/Met  1/11/2020 1819 by Jeffery Buckner RN  Outcome: Progressing Towards Goal  Goal: *Using medications safely  Description  State effect of diabetes medications on diabetes; name diabetes medication taking, action and side effects. 1/11/2020 1839 by Jeffery Buckner RN  Outcome: Resolved/Met  1/11/2020 1819 by Jeffery Buckner RN  Outcome: Progressing Towards Goal  Goal: *Monitoring blood glucose, interpreting and using results  Description  Identify recommended blood glucose targets  and personal targets. 1/11/2020 1839 by Jeffery Buckner RN  Outcome: Resolved/Met  1/11/2020 1819 by Jeffery Buckner RN  Outcome: Progressing Towards Goal  Goal: *Prevention, detection, treatment of acute complications  Description  List symptoms of hyper- and hypoglycemia; describe how to treat low blood sugar and actions for lowering  high blood glucose level. 1/11/2020 1839 by Jeffery Buckner RN  Outcome: Resolved/Met  1/11/2020 1819 by Jeffery Buckner RN  Outcome: Progressing Towards Goal  Goal: *Prevention, detection and treatment of chronic complications  Description  Define the natural course of diabetes and describe the relationship of blood glucose levels to long term complications of diabetes.   1/11/2020 1839 by Jeffery Buckner RN  Outcome: Resolved/Met  1/11/2020 1819 by Jeffery Buckner RN  Outcome: Progressing Towards Goal  Goal: *Developing strategies to address psychosocial issues  Description  Describe feelings about living with diabetes; identify support needed and support network  1/11/2020 1839 by Gaby Alvarez RN  Outcome: Resolved/Met  1/11/2020 1819 by Gaby Alvarez RN  Outcome: Progressing Towards Goal  Goal: *Insulin pump training  1/11/2020 1839 by Gaby Alvarez RN  Outcome: Resolved/Met  1/11/2020 1819 by Gaby Alvarez RN  Outcome: Progressing Towards Goal  Goal: *Sick day guidelines  1/11/2020 1839 by Gaby Alvarez RN  Outcome: Resolved/Met  1/11/2020 1819 by Gaby Alvarez RN  Outcome: Progressing Towards Goal  Goal: *Patient Specific Goal (EDIT GOAL, INSERT TEXT)  1/11/2020 1839 by Gaby Alvarez RN  Outcome: Resolved/Met  1/11/2020 1819 by Gaby Alvarez RN  Outcome: Progressing Towards Goal     Problem: Patient Education: Go to Patient Education Activity  Goal: Patient/Family Education  1/11/2020 1839 by Gaby Alvarez RN  Outcome: Resolved/Met  1/11/2020 1819 by Gaby Alvarez RN  Outcome: Progressing Towards Goal     Problem: Patient Education: Go to Patient Education Activity  Goal: Patient/Family Education  1/11/2020 1839 by Gaby Alvarez RN  Outcome: Resolved/Met  1/11/2020 1819 by Gaby Alvarez RN  Outcome: Progressing Towards Goal

## 2020-01-11 NOTE — ROUTINE PROCESS
Patient stated that she is supposed to be discharged today. Noted discharge summary available by PA. Patient seen by MD and has prescription for Amlodipine. No return call from PA as of yet. Return call given to answering service. Awaiting return call.     Marla Villalobos, RN, BSN

## 2020-01-11 NOTE — PROGRESS NOTES
Patient received up in recliner. A&Ox4, denies pain and discomfort. No distress noted. Frequently use items within reach. Bed locked in low position. Call bell within reach and Patient verbalized understanding of use for assistance and needs. 1335- Patient given Dilaudid 1 mg IV for neck pain 10/10. See MAR and pain assessments.

## 2020-01-11 NOTE — CONSULTS
Reason for consultation:    Monitoring and management of  acute and chronic medical problems     Postoperative Diagnosis: c3-4 herniated nucleus pulposus   m50.20       Procedure: Procedure(s):  ANTERIOR CERVICAL Discectomy and  FUSION WITH  bone bank bone graft, plate M7-L2    HPI: pleasant obese lady post uneventful C-Spine surgery as outlined above; c/o post op pain .  No UE numbness or weakness; BP elevated in PACU; received labetalol and amlodipine; BP continuous running high   No CP SOB  No N/V  Voided once    ACTIVE MEDICAL PROBLEMS/PMH/PSH    Patient Active Problem List   Diagnosis Code    Follow-up examination following surgery Z09    Cancer of left breast ([address] C50.912    DM (diabetes mellitus) ([address] E11.9    HTN (hypertension) I10    Anxiety state F41.1    Arthritis of knee M17.10    Carpal tunnel syndrome G56.00    Contusion of wrist S60.219A    Benign essential HTN I10    Generalized anxiety disorder F41.1    Headache R51    Major depressive disorder, single episode, moderate (HCC) F32.1    Breast cancer, female ([address] C50.919    Adiposity E66.9    Multiple-type hyperlipidemia E78.2    Atrophic vaginitis N95.2    Renal mass N28.89    Type 2 diabetes mellitus (HCC) E11.9    Sleep apnea G47.30    Cervical stenosis of spinal canal M48.02     CKD 3     PMH:  Past Medical History:   Diagnosis Date    Ankle swelling     Arthritis     Breast cancer ([address]     Lt breast    Cancer [facility])     Left Breast    Chemotherapy     6 weeks for lt breast    Colon polyps     Diabetes ([address]     Follow-up examination following surgery     GERD (gastroesophageal reflux disease)     Globus syndrome     Heart murmur     Hematuria     Hypertension     Joint pain     Menopause     Psychiatric disorder     DEPRESSION, ANXIETY    Renal cyst     Sleep apnea     Unable to wear CPAP    Weight gain        PSH:  Past Surgical History:   Procedure Laterality Date    DELIVERY       x 3  HX BREAST BIOPSY Right     Excisional Biopsy - Benign    HX GYN      ovary removed    HX HYSTERECTOMY  09/06/2018 March 2018    HX MODIFIED RADICAL MASTECTOMY Left 2003    HX NEPHRECTOMY  06/27/11    left partial nephrectomy - Dr. Perry Rawls HX OOPHORECTOMY Bilateral 09/06/2018 March 2018       PTA MEDICATIONS  Medications Prior to Admission   Medication Sig    insulin aspart protamine/insulin aspart (NOVOLOG MIX 70-30 U-100 INSULN) 100 unit/mL (70-30) injection 30 Units by SubCUTAneous route Before breakfast, lunch, and dinner.  telmisartan-hydroCHLOROthiazide (MICARDIS HCT) 80-12.5 mg per tablet Take 1 Tab by mouth nightly.  cyanocobalamin (VITAMIN B-12) 100 mcg tablet Take 100 mcg by mouth daily.  ALPRAZolam (XANAX) 0.5 mg tablet Take 0.5 mg by mouth daily.  citalopram (CELEXA) 40 mg tablet TK 1 T PO D    hydrOXYzine pamoate (VISTARIL) 50 mg capsule TK 1 C PO TID PRN    KISHAN D ARCO BARK PO Take 2 Tabs by mouth two (2) times a day.  Cholecalciferol, Vitamin D3, (VITAMIN D3) 1,000 unit cap Take 2 Units by mouth daily.  glucose blood VI test strips (ASCENSIA AUTODISC VI, ONE TOUCH ULTRA TEST VI) strip by Does Not Apply route See Admin Instructions.  Lancets Misc by Does Not Apply route.  acetaminophen (TYLENOL) 500 mg tablet 500 mg.        ALLERGIES:    Allergies   Allergen Reactions    Latex, Natural Rubber Unknown (comments)    Anastrozole Other (comments)     CAUSED JOINT PAIN    Lipitor [Atorvastatin] Other (comments)     Muscle weakness    Oxycodone-Acetaminophen Itching    Pcn [Penicillins] Itching        FAMILY HISTORY   Father:  Mother:  Brothers:  Sisters :  Children    Family History   Problem Relation Age of Onset    Breast Cancer Paternal Grandmother     Breast Cancer Other     Heart Disease Mother     Asthma Mother     Hypertension Father     Colon Cancer Father     Diabetes Maternal Grandmother     Breast Cancer Niece 40       SOCIAL HISTORY  Marital Status:  Education Completed:  Occupation:  Tobacco use:   Alcohol use: no  Drug use: no  STI: no  Living will:   POA:     Social History     Socioeconomic History    Marital status:      Spouse name: Not on file    Number of children: Not on file    Years of education: Not on file    Highest education level: Not on file   Occupational History    Not on file   Social Needs    Financial resource strain: Not on file    Food insecurity:     Worry: Not on file     Inability: Not on file    Transportation needs:     Medical: Not on file     Non-medical: Not on file   Tobacco Use    Smoking status: Never Smoker    Smokeless tobacco: Never Used   Substance and Sexual Activity    Alcohol use: No    Drug use: Never    Sexual activity: Not on file   Lifestyle    Physical activity:     Days per week: Not on file     Minutes per session: Not on file    Stress: Not on file   Relationships    Social connections:     Talks on phone: Not on file     Gets together: Not on file     Attends [employer] service: Not on file     Active member of club or organization: Not on file     Attends meetings of clubs or organizations: Not on file     Relationship status: Not on file    Intimate partner violence:     Fear of current or ex partner: Not on file     Emotionally abused: Not on file     Physically abused: Not on file     Forced sexual activity: Not on file   Other Topics Concern    Not on file   Social History Narrative    Not on file       ROS:  Constitutional: . No headache  Neck:++pain. Cardiac: No CP. CHRISTIANSEN. No orthopnea. No PND. No leg edema . No palpitation  Respiratory: No cough . No SOB. No wheezing   GI: No nausea . No vomiting. No reflux. No abdominal pain. Normal BM . No black stool No blood in the stool  : No dysuria. No hematuria  M/S: No joint pain. No joint swelling. No back pain  Neuro: No numbness . No weakness  Skin: No rash. No itching  HEM/LYMPH:  No bruising. Endocrine: No polydipsia.  No change in tone of voice  Psych: + anxiety . + depression. No sleep difficulty       Physical Exam:      Visit Vitals  /86   Pulse 79   Temp 97.1 °F (36.2 °C)   Resp 18   Ht 5' 4\" (1.626 m)   Wt 92.5 kg (204 lb)   SpO2 95%   BMI 35.02 kg/m²       GENERAL: Awake NAD  HEENT:    Face:Symmetrical  CONJUNCTIVA: Pink. SCLERA: Anicteric    ORAL MUCOSA: Moist. No lesions  TONGUE: Moist. No lesions  TEETH: multiple  Extractions . No broken or loose tooth    GUMS: No bleeding or swelling  HARD SOFT/ PALATE/TONSILS: No Swelling. No ulcers. No Masses  OROPHARYNX: WNL  NECK: not examined  CAROTID ARTERIES: not examined  LUNGS: CTA. BS Normal Bilaterally  HEART: RRR   Normal S1 S2. No Significant murmur. No S3 S4  ABDOMEN: Soft. Normal BS. No tenderness. No HSM /SMG. No bruits. LE: No edema. SCD in place  PULSES: Radial 2+; posterior Tibialis 2+ Pedis 2+  SKIN:No rash.     MUSCULOSKELETAL:      Adequate ROM in all extremeties    NEUROLOGIC:     Oriented X 4  Muscle Strength  RUE: strength 5/5  LUE: strength 5/5  RLE: AT GCS 5/5  LLE: AT GCS 5/5     Reflexes:  Not testesd     PSYCHIATRIC    Mood: normal  Affect: appropriate                     Labs Reviewed:    Recent Results (from the past 24 hour(s))   GLUCOSE, POC    Collection Time: 01/10/20 11:04 AM   Result Value Ref Range    Glucose (POC) 129 (H) 70 - 110 mg/dL   GLUCOSE, POC    Collection Time: 01/10/20  4:38 PM   Result Value Ref Range    Glucose (POC) 171 (H) 70 - 110 mg/dL     01/03/20  Potassium 4.1 3.5 - 5.5 mmol/L   Sodium 140 133 - 145 mmol/L   Chloride 99 98 - 110 mmol/L   Glucose 174 (H) 70 - 99 mg/dL   Calcium 9.1 8.4 - 10.5 mg/dL   Albumin 4.1 3.5 - 5 g/dL   SGPT (ALT) 19 5 - 40 U/L   SGOT (AST) 18 10 - 37 U/L   Bilirubin Total 0.2 0.2 - 1.2 mg/dL   Alkaline Phosphatase 92 40 - 120 U/L   BUN 18 6 - 22 mg/dL   CO2 24 20 - 32 mmol/L   Creatinine 1.3 0.8 - 1.4 mg/dL   eGFR African American 47.7 (L) >60.0    eGFR Non  39.3 (L) >60.0 Globulin 3.3 2 - 4 g/dL     01/02/20  WBC x 10*3 10.9 4.0 - 11.0 K/uL [facility]     RBC x 10^6 4.27 3.80 - 5.20 M/uL [facility]     HGB 12.0 11.7 - 16.1 g/dL [facility]     HCT 38.4 35.1 - 48.3 % [facility]     MCV 90 81 - 99 fL [facility]     MCH 28 26 - 34 pg [facility]     MCHC 31 31 - 36 g/dL [facility]     RDW 12.1 10.0 - 15.5 % [facility]     Platelet 903 930 - 691 K/uL [facility]     MPV 11.2 9.0 - 13.0 fL [facility]         Imaging Reviewed:    ASSESSMENT  Post C-Spine surgery  Post uncontrolled HTN  Post op pain   T2 DM  CKD 3    PLAN  Hydralazine IV   Amlodipine  IVF w/o K  Analgesic  Lantuss   Corrective insulin  Monitor RF      Bryan Gamez MD  1/10/2020, 8:02 PM

## 2020-01-11 NOTE — PROGRESS NOTES
Reason for consultation:    Monitoring and management of  acute and chronic medical problems     Postoperative Diagnosis: c3-4 herniated nucleus pulposus   m50.20       Procedure: Procedure(s):  ANTERIOR CERVICAL Discectomy and  FUSION WITH  bone bank bone graft, plate U7-Z4    HPI:   Uneventful night  Post op pain controlle  CP SOB  No N/V  Voiding well      ACTIVE MEDICAL PROBLEMS/PMH/PSH    Patient Active Problem List   Diagnosis Code    Follow-up examination following surgery Z09    Cancer of left breast ([facility] C50.912    DM (diabetes mellitus) ([facility] E11.9    HTN (hypertension) I10    Anxiety state F41.1    Arthritis of knee M17.10    Carpal tunnel syndrome G56.00    Contusion of wrist S60.219A    Benign essential HTN I10    Generalized anxiety disorder F41.1    Headache R51    Major depressive disorder, single episode, moderate (HCC) F32.1    Breast cancer, female ([facility] C50.919    Adiposity E66.9    Multiple-type hyperlipidemia E78.2    Atrophic vaginitis N95.2    Renal mass N28.89    Type 2 diabetes mellitus (HCC) E11.9    Sleep apnea G47.30    Cervical stenosis of spinal canal M48.02     CKD 3     PMH:  Past Medical History:   Diagnosis Date    Ankle swelling     Arthritis     Breast cancer ([facility]     Lt breast    Cancer [facility])     Left Breast    Chemotherapy 2003    6 weeks for lt breast    Colon polyps     Diabetes ([facility]     Follow-up examination following surgery     GERD (gastroesophageal reflux disease)     Globus syndrome     Heart murmur     Hematuria     Hypertension     Joint pain     Menopause     Psychiatric disorder     DEPRESSION, ANXIETY    Renal cyst     Sleep apnea     Unable to wear CPAP    Weight gain        PSH:  Past Surgical History:   Procedure Laterality Date    DELIVERY       x 3    HX BREAST BIOPSY Right     Excisional Biopsy - Benign    HX GYN      ovary removed    HX HYSTERECTOMY  2018    HX MODIFIED RADICAL MASTECTOMY Left 2003    HX NEPHRECTOMY  06/27/11    left partial nephrectomy - Dr. Yarelis Wong HX OOPHORECTOMY Bilateral 09/06/2018 March 2018       PTA MEDICATIONS  Medications Prior to Admission   Medication Sig    insulin aspart protamine/insulin aspart (NOVOLOG MIX 70-30 U-100 INSULN) 100 unit/mL (70-30) injection 30 Units by SubCUTAneous route Before breakfast, lunch, and dinner.  telmisartan-hydroCHLOROthiazide (MICARDIS HCT) 80-12.5 mg per tablet Take 1 Tab by mouth nightly.  cyanocobalamin (VITAMIN B-12) 100 mcg tablet Take 100 mcg by mouth daily.  ALPRAZolam (XANAX) 0.5 mg tablet Take 0.5 mg by mouth daily.  citalopram (CELEXA) 40 mg tablet TK 1 T PO D    hydrOXYzine pamoate (VISTARIL) 50 mg capsule TK 1 C PO TID PRN    KISHAN D ARCO BARK PO Take 2 Tabs by mouth two (2) times a day.  Cholecalciferol, Vitamin D3, (VITAMIN D3) 1,000 unit cap Take 2 Units by mouth daily.  glucose blood VI test strips (ASCENSIA AUTODISC VI, ONE TOUCH ULTRA TEST VI) strip by Does Not Apply route See Admin Instructions.  Lancets Misc by Does Not Apply route.  acetaminophen (TYLENOL) 500 mg tablet 500 mg.        ALLERGIES:    Allergies   Allergen Reactions    Latex, Natural Rubber Unknown (comments)    Anastrozole Other (comments)     CAUSED JOINT PAIN    Lipitor [Atorvastatin] Other (comments)     Muscle weakness    Oxycodone-Acetaminophen Itching    Pcn [Penicillins] Itching        FAMILY HISTORY   Father:  Mother:  Brothers:  Sisters :  Children    Family History   Problem Relation Age of Onset    Breast Cancer Paternal Grandmother     Breast Cancer Other     Heart Disease Mother     Asthma Mother     Hypertension Father     Colon Cancer Father     Diabetes Maternal Grandmother     Breast Cancer Niece 40       SOCIAL HISTORY  Marital Status:  Education Completed:  Occupation:  Tobacco use:   Alcohol use: no  Drug use: no  STI: no  Living will:   POA:     Social History     Socioeconomic History    Marital status:      Spouse name: Not on file    Number of children: Not on file    Years of education: Not on file    Highest education level: Not on file   Occupational History    Not on file   Social Needs    Financial resource strain: Not on file    Food insecurity:     Worry: Not on file     Inability: Not on file    Transportation needs:     Medical: Not on file     Non-medical: Not on file   Tobacco Use    Smoking status: Never Smoker    Smokeless tobacco: Never Used   Substance and Sexual Activity    Alcohol use: No    Drug use: Never    Sexual activity: Not on file   Lifestyle    Physical activity:     Days per week: Not on file     Minutes per session: Not on file    Stress: Not on file   Relationships    Social connections:     Talks on phone: Not on file     Gets together: Not on file     Attends Zoroastrianism service: Not on file     Active member of club or organization: Not on file     Attends meetings of clubs or organizations: Not on file     Relationship status: Not on file    Intimate partner violence:     Fear of current or ex partner: Not on file     Emotionally abused: Not on file     Physically abused: Not on file     Forced sexual activity: Not on file   Other Topics Concern    Not on file   Social History Narrative    Not on file         Physical Exam:      Visit Vitals  /69 (BP 1 Location: Right arm, BP Patient Position: Sitting)   Pulse 88   Temp 98.6 °F (37 °C)   Resp 18   Ht 5' 4\" (1.626 m)   Wt 92.5 kg (204 lb)   SpO2 95%   BMI 35.02 kg/m²       GENERAL:  LUNGS: CTA. BS Normal Bilaterally  HEART: RRR   Normal S1 S2. No Significant murmur. No S3 S4  ABDOMEN: Soft. Normal BS. No tenderness. LE: No edema.      NEUROLOGIC:     Oriented X 4  Muscle Strength  RUE: strength 5/5  LUE: strength 5/5     PSYCHIATRIC    Mood: normal  Affect: appropriate                     Labs Reviewed:    Recent Results (from the past 24 hour(s))   GLUCOSE, POC Collection Time: 01/10/20  9:34 PM   Result Value Ref Range    Glucose (POC) 280 (H) 70 - 110 mg/dL   HEMOGLOBIN A1C WITH EAG    Collection Time: 01/11/20  4:00 AM   Result Value Ref Range    Hemoglobin A1c 7.7 (H) 4.2 - 5.6 %    Est. average glucose 069 mg/dL   METABOLIC PANEL, BASIC    Collection Time: 01/11/20  4:00 AM   Result Value Ref Range    Sodium 135 (L) 136 - 145 mmol/L    Potassium 4.1 3.5 - 5.5 mmol/L    Chloride 103 100 - 111 mmol/L    CO2 24 21 - 32 mmol/L    Anion gap 8 3.0 - 18 mmol/L    Glucose 336 (H) 74 - 99 mg/dL    BUN 19 (H) 7.0 - 18 MG/DL    Creatinine 1.19 0.6 - 1.3 MG/DL    BUN/Creatinine ratio 16 12 - 20      GFR est AA 55 (L) >60 ml/min/1.73m2    GFR est non-AA 45 (L) >60 ml/min/1.73m2    Calcium 8.5 8.5 - 10.1 MG/DL   GLUCOSE, POC    Collection Time: 01/11/20  6:46 AM   Result Value Ref Range    Glucose (POC) 302 (H) 70 - 110 mg/dL   GLUCOSE, POC    Collection Time: 01/11/20  4:47 PM   Result Value Ref Range    Glucose (POC) 252 (H) 70 - 110 mg/dL     01/03/20  Potassium 4.1 3.5 - 5.5 mmol/L   Sodium 140 133 - 145 mmol/L   Chloride 99 98 - 110 mmol/L   Glucose 174 (H) 70 - 99 mg/dL   Calcium 9.1 8.4 - 10.5 mg/dL   Albumin 4.1 3.5 - 5 g/dL   SGPT (ALT) 19 5 - 40 U/L   SGOT (AST) 18 10 - 37 U/L   Bilirubin Total 0.2 0.2 - 1.2 mg/dL   Alkaline Phosphatase 92 40 - 120 U/L   BUN 18 6 - 22 mg/dL   CO2 24 20 - 32 mmol/L   Creatinine 1.3 0.8 - 1.4 mg/dL   eGFR African American 47.7 (L) >60.0    eGFR Non  39.3 (L) >60.0    Globulin 3.3 2 - 4 g/dL     01/02/20  WBC x 10*3 10.9 4.0 - 11.0 K/uL [facility]     RBC x 10^6 4.27 3.80 - 5.20 M/uL [facility]     HGB 12.0 11.7 - 16.1 g/dL [facility]     HCT 38.4 35.1 - 48.3 % [facility]     MCV 90 81 - 99 fL [facility]     MCH 28 26 - 34 pg [facility]     MCHC 31 31 - 36 g/dL [facility]     RDW 12.1 10.0 - 15.5 % [facility]     Platelet 391 190 - 736 K/uL [facility]     MPV 11.2 9.0 - 13.0 fL [facility]         Imaging Reviewed:    ASSESSMENT  Post C-Spine surgery  HTN controlled  Post op pain   T2 DM  CKD 3; RF improved with IVF and off Micardis HCT    PLAN  Medically stable for D/C  Hold  micardis HCT for now  Rx written for amlodipine 10 mg 1 tablet po daily # 30 no refills  F/U with PMD re: BP and resuming ARB/ACE-I      Aide Rodriguez MD  1/11/2020, 8:02 PM

## 2020-01-11 NOTE — PROGRESS NOTES
Problem: Falls - Risk of  Goal: *Absence of Falls  Description  Document Armen Deleonal Fall Risk and appropriate interventions in the flowsheet.   Outcome: Progressing Towards Goal  Note: Fall Risk Interventions:            Medication Interventions: Patient to call before getting OOB, Teach patient to arise slowly    Elimination Interventions: Call light in reach              Problem: Patient Education: Go to Patient Education Activity  Goal: Patient/Family Education  Outcome: Progressing Towards Goal     Problem: Pain  Goal: *Control of Pain  Outcome: Progressing Towards Goal     Problem: Patient Education: Go to Patient Education Activity  Goal: Patient/Family Education  Outcome: Progressing Towards Goal

## 2020-01-11 NOTE — DISCHARGE SUMMARY
Discharge Summary     Patient: Joselyn Pressley MRN: [id number]  SSN: [id number]    YOB: 1950  Age: 77 y.o.   Sex: female       Admit Date: 1/10/2020    Discharge Date: 1/11/2020      Admission Diagnoses: Cervical stenosis of spinal canal [M48.02]    Discharge Diagnoses:   Problem List as of 1/11/2020 Date Reviewed: 1/10/2020          Codes Class Noted - Resolved    Cervical stenosis of spinal canal ICD-10-CM: M48.02  ICD-9-CM: 723.0  1/10/2020 - Present        Cancer of left breast ([facility] ICD-10-CM: C50.912  ICD-9-CM: 174.9  7/29/2013 - Present        DM (diabetes mellitus) ([facility] ICD-10-CM: E11.9  ICD-9-CM: 250.00  7/29/2013 - Present        HTN (hypertension) ICD-10-CM: I10  ICD-9-CM: 401.9  7/29/2013 - Present        Carpal tunnel syndrome ICD-10-CM: G56.00  ICD-9-CM: 354.0  11/8/2012 - Present        Contusion of wrist ICD-10-CM: D31.594H  ICD-9-CM: 923.21  11/8/2012 - Present        Arthritis of knee ICD-10-CM: M17.10  ICD-9-CM: 716.96  7/5/2012 - Present        Renal mass ICD-10-CM: N28.89  ICD-9-CM: 593.9  6/27/2011 - Present        Follow-up examination following surgery ICD-10-CM: Z09  ICD-9-CM: V67.00  Unknown - Present        Anxiety state ICD-10-CM: F41.1  ICD-9-CM: 300.00  10/16/2007 - Present        Benign essential HTN ICD-10-CM: I10  ICD-9-CM: 401.1  10/16/2007 - Present        Generalized anxiety disorder ICD-10-CM: F41.1  ICD-9-CM: 300.02  10/16/2007 - Present        Headache ICD-10-CM: R51  ICD-9-CM: 784.0  10/16/2007 - Present        Major depressive disorder, single episode, moderate ([facility] ICD-10-CM: F32.1  ICD-9-CM: 296.22  10/16/2007 - Present        Breast cancer, female ([facility] ICD-10-CM: C50.919  ICD-9-CM: 174.9  10/16/2007 - Present        Adiposity ICD-10-CM: E66.9  ICD-9-CM: 278.00  10/16/2007 - Present        Multiple-type hyperlipidemia ICD-10-CM: E78.2  ICD-9-CM: 272.4  10/16/2007 - Present        Atrophic vaginitis ICD-10-CM: N95.2  ICD-9-CM: 627.3  10/16/2007 - Present Type 2 diabetes mellitus ([facility] ICD-10-CM: E11.9  ICD-9-CM: 250.00  10/16/2007 - Present    Overview Signed 10/19/2015  3:10 PM by Rivera Harris LPN     Overview:   Dx - 1996             Sleep apnea ICD-10-CM: G47.30  ICD-9-CM: 780.57  10/16/2007 - Present               Discharge Condition: Good      Consults: None    Physical Exam  Pt in cervical collar. Has full range of motion in her bilateral upper extremities. Strength is symmetric and 5/5 in hand. Neurovascularly intact with 2+ pulses bilaterally and sensation to light touch. Disposition: home    Discharge Medications:   Current Discharge Medication List      CONTINUE these medications which have NOT CHANGED    Details   insulin aspart protamine/insulin aspart (NOVOLOG MIX 70-30 U-100 INSULN) 100 unit/mL (70-30) injection 30 Units by SubCUTAneous route Before breakfast, lunch, and dinner. telmisartan-hydroCHLOROthiazide (MICARDIS HCT) 80-12.5 mg per tablet Take 1 Tab by mouth nightly. cyanocobalamin (VITAMIN B-12) 100 mcg tablet Take 100 mcg by mouth daily. ALPRAZolam (XANAX) 0.5 mg tablet Take 0.5 mg by mouth daily. citalopram (CELEXA) 40 mg tablet TK 1 T PO D  Refills: 1      hydrOXYzine pamoate (VISTARIL) 50 mg capsule TK 1 C PO TID PRN  Refills: 1      KISHAN D ARCO BARK PO Take 2 Tabs by mouth two (2) times a day. Cholecalciferol, Vitamin D3, (VITAMIN D3) 1,000 unit cap Take 2 Units by mouth daily. glucose blood VI test strips (ASCENSIA AUTODISC VI, ONE TOUCH ULTRA TEST VI) strip by Does Not Apply route See Admin Instructions. Lancets Misc by Does Not Apply route. acetaminophen (TYLENOL) 500 mg tablet 500 mg.              Activity: No lifting, Driving, or Strenuous exercise for 3 months, No driving for 3 weeks, No driving while on analgesics and See surgical instructions  Diet: Regular Diet  Wound Care: Keep wound clean and dry and Reinforce dressing PRN    No orders of the defined types were placed in this encounter.       Signed By: QUENTIN Allen     January 11, 2020

## 2020-01-11 NOTE — PROGRESS NOTES
Problem: Mobility Impaired (Adult and Pediatric)  Goal: *Acute Goals and Plan of Care (Insert Text)  Description  Physical Therapy Goals  Initiated 1/11/2020 and to be accomplished within 7 day(s)  1. Patient will move from supine to sit and sit to supine in bed with supervision/set-up in order to facilitate eating meals in sitting. 2.  Patient will transfer from bed to chair and chair to bed with supervision/set-up using the least restrictive device in order to facilitate participation in sitting ADLs. 3.  Patient will perform sit to stand with supervision/set-up in order to prepare for standing ADLs. 4.  Patient will ambulate with supervision/set-up for >/=65 feet with the least restrictive device in order to facilitate improved ease with ambulation to the restroom. 5.  Patient will ascend/descend 4 stairs with handrail(s) with minimal assistance/contact guard assist in order to prepare patient to safely enter residence. Outcome: Progressing Towards Goal   PHYSICAL THERAPY EVALUATION    Patient: Jose Pineda (6 y.o. female)  Date: 1/11/2020  Primary Diagnosis: Cervical stenosis of spinal canal [M48.02]  Procedure(s) (LRB):  ANTERIOR CERVICAL Discectomy and  FUSION WITH  bone bank bone graft, plate Q3-I1 (N/A) 1 Day Post-Op   Precautions: C/S Post Surgical        PLOF: Mod I with SPC    ASSESSMENT :  Patient is a 77 y.o. female who presents to Physical Therapy with diagnosis of Cervical stenosis of spinal canal [M48.02]. Patient is sitting in recliner and agrees to participate in PT services. Upon objective evaluation, patient demonstrated impaired REJI LE strength in ankle DF (2+/5), impaired L LE L2/4/S1 light touch sensation, and good standing static and dynamic balance. Patient ambulated 4 ft with step to gait and RW and required CGA for safety.  Demonstrated good mechanics with sit to stand however, with stand to sit from RW to bed patient demonstrated decreased safety with lowering and required cuing for safety. Patient education in post surgical cervical precautions and issued handout. Patient requires between minimal assistance/contact guard assist and SBA for bed mobility, transfers and ambulation. Patient can benefit from skilled PT interventions to decrease r/o falls and improve REJI LE strength, increase walking/standing tolerance, and improve body mechanics mechanics with bed mobility and transfers to facilitate ADL's & overall functional status/quality of life. Patient will benefit from skilled intervention to address the above impairments. Patient's rehabilitation potential is considered to be Good  Factors which may influence rehabilitation potential include:   []         None noted  []         Mental ability/status  []         Medical condition  []         Home/family situation and support systems  []         Safety awareness  [x]         Pain tolerance/management  []         Other:      PLAN :  Recommendations and Planned Interventions:   [x]           Bed Mobility Training             [x]    Neuromuscular Re-Education  [x]           Transfer Training                   []    Orthotic/Prosthetic Training  [x]           Gait Training                          []    Modalities  [x]           Therapeutic Exercises           []    Edema Management/Control  [x]           Therapeutic Activities            [x]    Family Training/Education  [x]           Patient Education  []           Other (comment):    Frequency/Duration: Patient will be followed by physical therapy 4-7x per week to address goals. Discharge Recommendations: Home Health  Further Equipment Recommendations for Discharge: SPC (has) and rolling walker pending presentation at [place]     SUBJECTIVE:   Patient stated sore throat, just had pain medication.     OBJECTIVE DATA SUMMARY:     Past Medical History:   Diagnosis Date    Ankle swelling     Arthritis     Breast cancer ([facility] 2003    Lt breast    Cancer ([facility]     Left Breast Chemotherapy     6 weeks for lt breast    Colon polyps     Diabetes ([facility]     Follow-up examination following surgery     GERD (gastroesophageal reflux disease)     Globus syndrome     Heart murmur     Hematuria     Hypertension     Joint pain     Menopause     Psychiatric disorder     DEPRESSION, ANXIETY    Renal cyst     Sleep apnea     Unable to wear CPAP    Weight gain      Past Surgical History:   Procedure Laterality Date    DELIVERY       x 3    HX BREAST BIOPSY Right     Excisional Biopsy - Benign    HX GYN      ovary removed    HX HYSTERECTOMY  2018    HX MODIFIED RADICAL MASTECTOMY Left 2003    HX NEPHRECTOMY  11    left partial nephrectomy - Dr. Taty Barron Bilateral 2018     Barriers to Learning/Limitations: None  Compensate with: N/A  Home Situation:  Home Situation  Home Environment: Private residence  # Steps to Enter: 4  Rails to Enter: Yes  Hand Rails : Bilateral  One/Two Story Residence: Two story  Living Alone: No  Support Systems: Spouse/Significant Other/Partner  Current DME Used/Available at Home: Cane, straight  Critical Behavior:  Neurologic State: Alert; Appropriate for age  Orientation Level: Oriented X4  Cognition: Follows commands     Psychosocial  Patient Behaviors: Cooperative  Family  Behaviors: Appropriate for situation; Cooperative  Purposeful Interaction: Yes     Family  Behaviors: Appropriate for situation; Cooperative     Functional Mobility:  Bed Mobility:  Rolling: Stand-by assistance  Supine to Sit: Minimum assistance  Sit to Supine: Stand-by assistance  Transfers:  Sit to Stand: Minimum assistance  Stand to Sit: Stand-by assistance  Balance:   Sitting: Impaired  Sitting - Static: Good (unsupported)  Sitting - Dynamic: Good (unsupported)  Standing: Impaired  Standing - Static: Good  Ambulation/Gait Training:  Distance (ft): 4 Feet (ft)  Assistive Device: Walker, rolling  Ambulation - Level of Assistance: Contact guard assistance  Gait Abnormalities: Antalgic  Base of Support: Center of gravity altered  Speed/Samantha: Slow  Step Length: Left shortened;Right shortened    Pain:  Pain level pre-treatment: 0/10   Pain level post-treatment: 5/10   Pain Intervention(s) : Medication (see MAR); Rest, Ice, Repositioning  Response to intervention: Nurse notified, See doc flow    Activity Tolerance: Fair secondary to increased C/S pain level s/p functional mobility   Please refer to the flowsheet for vital signs taken during this treatment. After treatment:   [x]         Patient left in no apparent distress sitting up in chair  []         Patient left in no apparent distress in bed  [x]         Call bell left within reach  [x]         Nursing notified  []         Caregiver present  []         Bed alarm activated  []         SCDs applied    COMMUNICATION/EDUCATION:   [x]         Role of Physical Therapy in the acute care setting. [x]         Fall prevention education was provided and the patient/caregiver indicated understanding. [x]         Patient/family have participated as able in goal setting and plan of care. [x]         Patient/family agree to work toward stated goals and plan of care. [x]         Patient understands intent and goals of therapy, but is neutral about his/her participation. []         Patient is unable to participate in goal setting/plan of care: ongoing with therapy staff.  []         Other:     Thank you for this referral.  Juventino Bermudez   Time Calculation: 24 mins

## 2020-01-11 NOTE — ROUTINE PROCESS
Bedside and Verbal shift change report given to Kenrick (oncoming nurse) by Donovan Lacey (offgoing nurse). Report included the following information SBAR, Kardex, Intake/Output, MAR and Cardiac Rhythm NSR w/cont pulse ox. [address].

## 2020-01-11 NOTE — PROGRESS NOTES
Problem: Falls - Risk of  Goal: *Absence of Falls  Description  Document Stacy Caro Fall Risk and appropriate interventions in the flowsheet. Outcome: Progressing Towards Goal  Note: Fall Risk Interventions:            Medication Interventions: Patient to call before getting OOB, Teach patient to arise slowly    Elimination Interventions: Patient to call for help with toileting needs, Call light in reach              Problem: Patient Education: Go to Patient Education Activity  Goal: Patient/Family Education  Outcome: Progressing Towards Goal     Problem: Pain  Goal: *Control of Pain  Outcome: Progressing Towards Goal     Problem: Patient Education: Go to Patient Education Activity  Goal: Patient/Family Education  Outcome: Progressing Towards Goal     Problem: Diabetes Self-Management  Goal: *Disease process and treatment process  Description  Define diabetes and identify own type of diabetes; list 3 options for treating diabetes. Outcome: Progressing Towards Goal  Goal: *Incorporating nutritional management into lifestyle  Description  Describe effect of type, amount and timing of food on blood glucose; list 3 methods for planning meals. Outcome: Progressing Towards Goal  Goal: *Incorporating physical activity into lifestyle  Description  State effect of exercise on blood glucose levels. Outcome: Progressing Towards Goal  Goal: *Developing strategies to promote health/change behavior  Description  Define the ABC's of diabetes; identify appropriate screenings, schedule and personal plan for screenings. Outcome: Progressing Towards Goal  Goal: *Using medications safely  Description  State effect of diabetes medications on diabetes; name diabetes medication taking, action and side effects. Outcome: Progressing Towards Goal  Goal: *Monitoring blood glucose, interpreting and using results  Description  Identify recommended blood glucose targets  and personal targets.   Outcome: Progressing Towards Goal  Goal: *Prevention, detection, treatment of acute complications  Description  List symptoms of hyper- and hypoglycemia; describe how to treat low blood sugar and actions for lowering  high blood glucose level. Outcome: Progressing Towards Goal  Goal: *Prevention, detection and treatment of chronic complications  Description  Define the natural course of diabetes and describe the relationship of blood glucose levels to long term complications of diabetes.   Outcome: Progressing Towards Goal  Goal: *Developing strategies to address psychosocial issues  Description  Describe feelings about living with diabetes; identify support needed and support network  Outcome: Progressing Towards Goal  Goal: *Insulin pump training  Outcome: Progressing Towards Goal  Goal: *Sick day guidelines  Outcome: Progressing Towards Goal  Goal: *Patient Specific Goal (EDIT GOAL, INSERT TEXT)  Outcome: Progressing Towards Goal     Problem: Patient Education: Go to Patient Education Activity  Goal: Patient/Family Education  Outcome: Progressing Towards Goal     Problem: Patient Education: Go to Patient Education Activity  Goal: Patient/Family Education  Outcome: Progressing Towards Goal

## 2020-01-12 NOTE — ROUTINE PROCESS
Received verbal order to discharge patient to home. Discharge instructions reviewed. PIV removed. Percocet prescription shredded with wristbands. Taken to car via wheelchair. Discharged to home with spouse.     Maranda Aguilera, RN, BSN
